# Patient Record
Sex: FEMALE | Race: WHITE | NOT HISPANIC OR LATINO | Employment: FULL TIME | URBAN - METROPOLITAN AREA
[De-identification: names, ages, dates, MRNs, and addresses within clinical notes are randomized per-mention and may not be internally consistent; named-entity substitution may affect disease eponyms.]

---

## 2018-06-26 ENCOUNTER — APPOINTMENT (OUTPATIENT)
Dept: LAB | Age: 48
End: 2018-06-26

## 2018-06-26 ENCOUNTER — TRANSCRIBE ORDERS (OUTPATIENT)
Dept: URGENT CARE | Age: 48
End: 2018-06-26

## 2018-06-26 ENCOUNTER — APPOINTMENT (OUTPATIENT)
Dept: URGENT CARE | Age: 48
End: 2018-06-26

## 2018-06-26 DIAGNOSIS — Z02.1 PHYSICAL EXAM, PRE-EMPLOYMENT: Primary | ICD-10-CM

## 2018-06-26 DIAGNOSIS — Z02.1 PHYSICAL EXAM, PRE-EMPLOYMENT: ICD-10-CM

## 2018-06-26 PROCEDURE — 36415 COLL VENOUS BLD VENIPUNCTURE: CPT

## 2018-06-26 PROCEDURE — 86480 TB TEST CELL IMMUN MEASURE: CPT

## 2018-06-28 LAB
ANNOTATION COMMENT IMP: NORMAL
GAMMA INTERFERON BACKGROUND BLD IA-ACNC: 0.07 IU/ML
M TB IFN-G BLD-IMP: NEGATIVE
M TB IFN-G CD4+ BCKGRND COR BLD-ACNC: <0.01 IU/ML
M TB IFN-G CD4+ T-CELLS BLD-ACNC: 0.04 IU/ML
MITOGEN IGNF BLD-ACNC: 5.86 IU/ML
QUANTIFERON-TB GOLD IN TUBE: NORMAL
SERVICE CMNT-IMP: NORMAL

## 2021-02-16 ENCOUNTER — HOSPITAL ENCOUNTER (EMERGENCY)
Facility: HOSPITAL | Age: 51
Discharge: HOME | End: 2021-02-16
Attending: EMERGENCY MEDICINE | Admitting: EMERGENCY MEDICINE
Payer: OTHER GOVERNMENT

## 2021-02-16 ENCOUNTER — APPOINTMENT (EMERGENCY)
Dept: RADIOLOGY | Facility: HOSPITAL | Age: 51
End: 2021-02-16
Payer: OTHER GOVERNMENT

## 2021-02-16 VITALS
RESPIRATION RATE: 16 BRPM | HEART RATE: 60 BPM | DIASTOLIC BLOOD PRESSURE: 57 MMHG | OXYGEN SATURATION: 98 % | SYSTOLIC BLOOD PRESSURE: 106 MMHG | TEMPERATURE: 98.9 F
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
## 2021-02-16 DIAGNOSIS — M54.9 BACK PAIN: ICD-10-CM

## 2021-02-16 PROCEDURE — 73030 X-RAY EXAM OF SHOULDER: CPT

## 2021-02-16 PROCEDURE — 99283 EMERGENCY DEPT VISIT LOW MDM: CPT

## 2021-02-16 PROCEDURE — 99284 EMERGENCY DEPT VISIT MOD MDM: CPT | Performed by: EMERGENCY MEDICINE

## 2021-02-16 RX ORDER — ASCORBIC ACID 100 MG
1 TABLET,CHEWABLE ORAL DAILY
COMMUNITY

## 2021-02-16 RX ORDER — IBUPROFEN 600 MG/1
600 TABLET ORAL ONCE
Status: COMPLETED | OUTPATIENT
Start: 2021-02-16 | End: 2021-02-16

## 2021-02-16 RX ADMIN — IBUPROFEN 600 MG: 600 TABLET ORAL at 07:08

## 2021-02-16 NOTE — ED ATTENDING ATTESTATION
2/16/2021  IMelany MD, saw and evaluated the patient  I have discussed the patient with the resident/non-physician practitioner and agree with the resident's/non-physician practitioner's findings, Plan of Care, and MDM as documented in the resident's/non-physician practitioner's note, except where noted  All available labs and Radiology studies were reviewed  I was present for key portions of any procedure(s) performed by the resident/non-physician practitioner and I was immediately available to provide assistance  At this point I agree with the current assessment done in the Emergency Department  I have conducted an independent evaluation of this patient a history and physical is as follows:    ED Course      Emergency Department Note- Cedrick Galvan 48 y o  female MRN: [id number]    Unit/Bed#: ED 23 Encounter: [id number]    Cedrick Galvan is a 48 y o  female who presents with   Chief Complaint   Patient presents with    Fall     pt slipped on ice and fell on left shoulder/elbow  History of Present Illness   HPI:  Cedrick Galvan is a 48 y o  female who presents for evaluation of:  Slipped on ice and hurt her right shoulder  Patient denies head strike  She denies AC and AP medications  Her right scapula pain is moderate in intensity, aching sensation, over posterior scapula, movement does not provoke discomfort, and she has not taken any pain medications at home  Review of Systems   Constitutional: Negative for chills and fever  HENT: Negative for congestion and rhinorrhea  Respiratory: Negative for cough and shortness of breath  Cardiovascular: Negative for chest pain and palpitations  Neurological: Negative for dizziness and headaches  All other systems reviewed and are negative  Historical Information   History reviewed  No pertinent past medical history  History reviewed  No pertinent surgical history    Social History   Social History     Substance and Sexual Activity   Alcohol Use Yes    Frequency: Monthly or less    Drinks per session: 1 or 2    Binge frequency: Never     Social History     Substance and Sexual Activity   Drug Use Never     Social History     Tobacco Use   Smoking Status Never Smoker   Smokeless Tobacco Never Used     Family History: non-contributory    Meds/Allergies   all medications and allergies reviewed  No Known Allergies    Objective   First Vitals:   Blood Pressure: 106/57 (21)  Pulse: 60 (21)  Temperature: 98 9 °F (37 2 °C) (21)  Temp Source: Oral (21)  Respirations: 16 (21)  SpO2: 98 % (21)    Current Vitals:   Blood Pressure: 106/57 (21)  Pulse: 60 (21)  Temperature: 98 9 °F (37 2 °C) (21)  Temp Source: Oral (21)  Respirations: 16 (21)  SpO2: 98 % (21)    No intake or output data in the 24 hours ending 21 2304    Invasive Devices     None                 Physical Exam  Vitals signs and nursing note reviewed  Constitutional:       Appearance: Normal appearance  HENT:      Head: Normocephalic and atraumatic  Neck:      Musculoskeletal: Normal range of motion and neck supple  Pulmonary:      Effort: Pulmonary effort is normal  No respiratory distress  Musculoskeletal: Normal range of motion  General: Tenderness (right scapula) present  No deformity  Skin:     General: Skin is warm and dry  Capillary Refill: Capillary refill takes less than 2 seconds  Neurological:      General: No focal deficit present  Mental Status: She is alert and oriented to person, place, and time  Psychiatric:         Mood and Affect: Mood normal          Behavior: Behavior normal          Thought Content: Thought content normal          Judgment: Judgment normal            Medical Decision Makin   Acute right scapular pain: XR r/o Fx; pain control    No results found for this or any previous visit (from the past 36 hour(s))  XR shoulder 2+ views RIGHT   Final Result      No acute osseous abnormality  Workstation performed: [id number]               Portions of the record may have been created with voice recognition software  Occasional wrong word or "sound a like" substitutions may have occurred due to the inherent limitations of voice recognition software  Read the chart carefully and recognize, using context, where substitutions have occurred            Critical Care Time  Procedures

## 2021-02-16 NOTE — ED PROVIDER NOTES
History  Chief Complaint   Patient presents with    Fall     pt slipped on ice and fell on left shoulder/elbow  59-year-old female no major past medical history, presenting due to fall right upper back pain  Patient states she was walking on her driveway this morning when she slipped on ice and fell on her right side  States he has some right upper back pain that is worse with certain movements  Did not take anything for pain at today  Denies any blood thinning medication, head strike loss of consciousness, preceding symptoms  Denies any weakness numbness or tingling in extremities  Prior to Admission Medications   Prescriptions Last Dose Informant Patient Reported? Taking? Ascorbic Acid (Vitamin C) 100 MG CHEW 2/16/2021  Yes Yes   Sig: Chew 1 tablet daily   Cholecalciferol 50 MCG (2000 UT) CAPS 2/16/2021  Yes Yes   Sig: Take 1 capsule by mouth daily   Multiple Vitamin (MULTI-VITAMIN DAILY PO) 2/16/2021  Yes Yes   Sig: Take 1 capsule by mouth   Specialty Vitamins Products (Biotin Plus Keratin) 00529-631 MCG-MG TABS 2/16/2021  Yes Yes   Sig: Take 1 tablet by mouth daily      Facility-Administered Medications: None       History reviewed  No pertinent past medical history  History reviewed  No pertinent surgical history  History reviewed  No pertinent family history  I have reviewed and agree with the history as documented  E-Cigarette/Vaping    E-Cigarette Use Never User      E-Cigarette/Vaping Substances     Social History     Tobacco Use    Smoking status: Never Smoker    Smokeless tobacco: Never Used   Substance Use Topics    Alcohol use: Yes     Frequency: Monthly or less     Drinks per session: 1 or 2     Binge frequency: Never    Drug use: Never        Review of Systems   Constitutional: Negative for chills and fever  HENT: Negative for ear pain and sore throat  Eyes: Negative for visual disturbance  Respiratory: Negative for cough and shortness of breath  Cardiovascular: Negative for chest pain and palpitations  Gastrointestinal: Negative for abdominal pain and vomiting  Genitourinary: Negative for dysuria and hematuria  Musculoskeletal: Positive for back pain  Negative for arthralgias  Skin: Negative for color change, rash and wound  Neurological: Negative for syncope  All other systems reviewed and are negative  Physical Exam  ED Triage Vitals [02/16/21 0631]   Temperature Pulse Respirations Blood Pressure SpO2   98 9 °F (37 2 °C) 60 16 106/57 98 %      Temp Source Heart Rate Source Patient Position - Orthostatic VS BP Location FiO2 (%)   Oral Monitor Lying Right arm --      Pain Score       5             Orthostatic Vital Signs  Vitals:    02/16/21 0631   BP: 106/57   Pulse: 60   Patient Position - Orthostatic VS: Lying       Physical Exam  Vitals signs and nursing note reviewed  Constitutional:       General: She is not in acute distress  Appearance: She is well-developed  HENT:      Head: Normocephalic and atraumatic  Eyes:      Conjunctiva/sclera: Conjunctivae normal    Neck:      Musculoskeletal: Neck supple  Cardiovascular:      Rate and Rhythm: Normal rate and regular rhythm  Heart sounds: No murmur  Pulmonary:      Effort: Pulmonary effort is normal  No respiratory distress  Breath sounds: Normal breath sounds  Abdominal:      Palpations: Abdomen is soft  Tenderness: There is no abdominal tenderness  Musculoskeletal: Normal range of motion  General: Tenderness present  No swelling  Comments: Tenderness over right scapula  Full ROM of neck  Full ROM of right shoulder  Sensation and motor intact   Skin:     General: Skin is warm and dry  Neurological:      Mental Status: She is alert and oriented to person, place, and time  Sensory: No sensory deficit  Motor: No weakness     Psychiatric:         Mood and Affect: Mood normal          ED Medications  Medications   ibuprofen (MOTRIN) tablet 600 mg (600 mg Oral Given 2/16/21 0708)       Diagnostic Studies  Results Reviewed     None                 XR shoulder 2+ views RIGHT   Final Result by Naeem Rodriguez MD (02/16 4157)      No acute osseous abnormality  Workstation performed: [id number]               Procedures  Procedures      ED Course                                       MDM  Number of Diagnoses or Management Options  Back pain:   Fall, initial encounter:   Diagnosis management comments: No sign of acute fracture on XR  Provided oral motrin with improvement in pain  No deficits or weakness, full ROM of shoulder  Will follow up with PCP  Disposition  Final diagnoses:   Fall, initial encounter   Back pain     Time reflects when diagnosis was documented in both MDM as applicable and the Disposition within this note     Time User Action Codes Description Comment    2/16/2021  8:06 AM Rhonda Townshend Add [D13  Juany Forge Fall, initial encounter     2/16/2021  8:06 AM Rhonda Whittingtonm Add [M54 9] Back pain       ED Disposition     ED Disposition Condition Date/Time Comment    Discharge Stable Tue Feb 16, 2021  8:06 AM Devaughn Gottlieb discharge to home/self care  Follow-up Information     Follow up With Specialties Details Why Asa Montes MD Family Medicine   [address] [place] [address]  [phone]            Discharge Medication List as of 2/16/2021  8:06 AM      CONTINUE these medications which have NOT CHANGED    Details   Ascorbic Acid (Vitamin C) 100 MG CHEW Chew 1 tablet daily, Historical Med      Cholecalciferol 50 MCG (2000 UT) CAPS Take 1 capsule by mouth daily, Historical Med      Multiple Vitamin (MULTI-VITAMIN DAILY PO) Take 1 capsule by mouth, Historical Med      Specialty Vitamins Products (Biotin Plus Keratin) 13171-508 MCG-MG TABS Take 1 tablet by mouth daily, Historical Med           No discharge procedures on file      PDMP Review     None           ED Provider  Attending physically available and evaluated Tena Angelucci I managed the patient along with the ED Attending      Electronically Signed by         Shelby Brian DO  02/16/21 3363

## 2021-12-24 ENCOUNTER — APPOINTMENT (OUTPATIENT)
Dept: RADIOLOGY | Facility: CLINIC | Age: 51
End: 2021-12-24
Payer: COMMERCIAL

## 2021-12-24 DIAGNOSIS — M54.9 DORSALGIA: ICD-10-CM

## 2021-12-24 DIAGNOSIS — J10.1 INFLUENZA A: ICD-10-CM

## 2021-12-24 PROCEDURE — 71046 X-RAY EXAM CHEST 2 VIEWS: CPT

## 2022-01-24 ENCOUNTER — APPOINTMENT (OUTPATIENT)
Dept: LAB | Facility: HOSPITAL | Age: 52
End: 2022-01-24
Payer: COMMERCIAL

## 2022-01-24 DIAGNOSIS — N93.8 DUB (DYSFUNCTIONAL UTERINE BLEEDING): ICD-10-CM

## 2022-01-24 LAB — B-HCG SERPL-ACNC: <2 MIU/ML

## 2022-01-24 PROCEDURE — 84702 CHORIONIC GONADOTROPIN TEST: CPT

## 2022-01-24 PROCEDURE — 36415 COLL VENOUS BLD VENIPUNCTURE: CPT

## 2022-03-30 ENCOUNTER — LAB REQUISITION (OUTPATIENT)
Dept: LAB | Facility: HOSPITAL | Age: 52
End: 2022-03-30

## 2022-03-30 DIAGNOSIS — Z00.8 ENCOUNTER FOR OTHER GENERAL EXAMINATION: ICD-10-CM

## 2022-03-30 LAB
CHOLEST SERPL-MCNC: 156 MG/DL
EST. AVERAGE GLUCOSE BLD GHB EST-MCNC: 103 MG/DL
HBA1C MFR BLD: 5.2 %
HDLC SERPL-MCNC: 80 MG/DL
LDLC SERPL CALC-MCNC: 66 MG/DL (ref 0–100)
NONHDLC SERPL-MCNC: 76 MG/DL
TRIGL SERPL-MCNC: 49 MG/DL

## 2022-03-30 PROCEDURE — 80061 LIPID PANEL: CPT | Performed by: PREVENTIVE MEDICINE

## 2022-03-30 PROCEDURE — 83036 HEMOGLOBIN GLYCOSYLATED A1C: CPT | Performed by: PREVENTIVE MEDICINE

## 2022-05-11 ENCOUNTER — HOSPITAL ENCOUNTER (EMERGENCY)
Facility: HOSPITAL | Age: 52
Discharge: HOME | End: 2022-05-11
Attending: EMERGENCY MEDICINE
Payer: OTHER MISCELLANEOUS

## 2022-05-11 ENCOUNTER — APPOINTMENT (EMERGENCY)
Dept: RADIOLOGY | Facility: HOSPITAL | Age: 52
End: 2022-05-11
Payer: OTHER MISCELLANEOUS

## 2022-05-11 VITALS
WEIGHT: 141 LBS | OXYGEN SATURATION: 99 % | HEART RATE: 77 BPM | DIASTOLIC BLOOD PRESSURE: 74 MMHG | SYSTOLIC BLOOD PRESSURE: 116 MMHG | RESPIRATION RATE: 18 BRPM | TEMPERATURE: 97.7 F

## 2022-05-11 DIAGNOSIS — S63.619A FINGER SPRAIN: ICD-10-CM

## 2022-05-11 DIAGNOSIS — S69.91XA INJURY OF FINGER OF RIGHT HAND, INITIAL ENCOUNTER: Primary | ICD-10-CM

## 2022-05-11 PROCEDURE — 99283 EMERGENCY DEPT VISIT LOW MDM: CPT

## 2022-05-11 PROCEDURE — 99282 EMERGENCY DEPT VISIT SF MDM: CPT | Performed by: EMERGENCY MEDICINE

## 2022-05-11 PROCEDURE — 73140 X-RAY EXAM OF FINGER(S): CPT

## 2022-05-11 NOTE — ED PROVIDER NOTES
History  Chief Complaint   Patient presents with    Finger Injury     Pt reports injuring finger due to a work related injury yesterday  PT states finger got caught on door      70-year-old female presenting with right pinky injury  Patient states yesterday she believes that she jammed her right little finger against a refrigerator door  Stated that initially she did not think much of it as it did not cause much pain but had increasing pain and some swelling in the mid phalanx  Full range of motion intact  Neurovascularly intact  No open wound  Happened at the work place  Denies any other injury or complaint  Prior to Admission Medications   Prescriptions Last Dose Informant Patient Reported? Taking? Ascorbic Acid (Vitamin C) 100 MG CHEW   Yes No   Sig: Chew 1 tablet daily   Cholecalciferol 50 MCG (2000 UT) CAPS   Yes No   Sig: Take 1 capsule by mouth daily   Multiple Vitamin (MULTI-VITAMIN DAILY PO)   Yes No   Sig: Take 1 capsule by mouth   Specialty Vitamins Products (Biotin Plus Keratin) 68458-356 MCG-MG TABS   Yes No   Sig: Take 1 tablet by mouth daily      Facility-Administered Medications: None       History reviewed  No pertinent past medical history  History reviewed  No pertinent surgical history  History reviewed  No pertinent family history  I have reviewed and agree with the history as documented  E-Cigarette/Vaping    E-Cigarette Use Never User      E-Cigarette/Vaping Substances     Social History     Tobacco Use    Smoking status: Never Smoker    Smokeless tobacco: Never Used   Vaping Use    Vaping Use: Never used   Substance Use Topics    Alcohol use: Yes    Drug use: Never        Review of Systems   Constitutional: Negative for appetite change, chills, diaphoresis, fever and unexpected weight change  HENT: Negative for congestion and rhinorrhea  Eyes: Negative for photophobia and visual disturbance     Respiratory: Negative for cough, chest tightness and shortness of breath  Cardiovascular: Negative for chest pain, palpitations and leg swelling  Gastrointestinal: Negative for abdominal distention, abdominal pain, blood in stool, constipation, diarrhea, nausea and vomiting  Genitourinary: Negative for dysuria and hematuria  Musculoskeletal: Positive for arthralgias  Negative for back pain, joint swelling, neck pain and neck stiffness  Skin: Negative for color change, pallor, rash and wound  Neurological: Negative for dizziness, syncope, weakness, light-headedness and headaches  Psychiatric/Behavioral: Negative for agitation  All other systems reviewed and are negative  Physical Exam  ED Triage Vitals [05/11/22 0935]   Temperature Pulse Respirations Blood Pressure SpO2   97 7 °F (36 5 °C) 77 18 116/74 99 %      Temp Source Heart Rate Source Patient Position - Orthostatic VS BP Location FiO2 (%)   Oral Monitor Sitting Right arm --      Pain Score       --             Orthostatic Vital Signs  Vitals:    05/11/22 0935   BP: 116/74   Pulse: 77   Patient Position - Orthostatic VS: Sitting       Physical Exam  Vitals and nursing note reviewed  Constitutional:       General: She is not in acute distress  Appearance: Normal appearance  She is well-developed  She is not ill-appearing, toxic-appearing or diaphoretic  HENT:      Head: Normocephalic and atraumatic  Nose: Nose normal  No congestion or rhinorrhea  Mouth/Throat:      Mouth: Mucous membranes are moist       Pharynx: Oropharynx is clear  No oropharyngeal exudate or posterior oropharyngeal erythema  Eyes:      General: No scleral icterus  Right eye: No discharge  Left eye: No discharge  Extraocular Movements: Extraocular movements intact  Conjunctiva/sclera: Conjunctivae normal       Pupils: Pupils are equal, round, and reactive to light  Neck:      Vascular: No JVD  Trachea: No tracheal deviation     Cardiovascular:      Rate and Rhythm: Normal rate and regular rhythm  Heart sounds: Normal heart sounds  No murmur heard  No friction rub  No gallop  Comments: Normal rate and regular rhythm  Pulmonary:      Effort: Pulmonary effort is normal  No respiratory distress  Breath sounds: Normal breath sounds  No stridor  No wheezing or rales  Comments: Clear to auscultation bilaterally  Chest:      Chest wall: No tenderness  Abdominal:      General: Bowel sounds are normal  There is no distension  Palpations: Abdomen is soft  Tenderness: There is no abdominal tenderness  There is no right CVA tenderness, left CVA tenderness, guarding or rebound  Comments: Soft, nontender, nondistended  Normal bowel sounds throughout   Musculoskeletal:         General: Swelling and tenderness present  No deformity or signs of injury  Normal range of motion  Cervical back: Normal range of motion and neck supple  No rigidity  No muscular tenderness  Right lower leg: No edema  Left lower leg: No edema  Comments: Very mild swelling right little finger middle phalanx as well as tenderness to palpation   Lymphadenopathy:      Cervical: No cervical adenopathy  Skin:     General: Skin is warm and dry  Coloration: Skin is not pale  Findings: No erythema or rash  Neurological:      General: No focal deficit present  Mental Status: She is alert  Mental status is at baseline  Sensory: No sensory deficit  Motor: No weakness or abnormal muscle tone  Coordination: Coordination normal       Gait: Gait normal       Comments: Alert  Right little finger full range of motion including the isolated flexion distal phalanx all intact  Sensation intact all distributions  Strength and sensation otherwise grossly intact  Ambulatory without difficulty at baseline  Psychiatric:         Behavior: Behavior normal          Thought Content:  Thought content normal          ED Medications  Medications - No data to display    Diagnostic Studies  Results Reviewed     None                 XR finger fifth digit-pinkie RIGHT    (Results Pending)         Procedures  Procedures      ED Course                             SBIRT 20yo+    Flowsheet Row Most Recent Value   SBIRT (23 yo +)    In order to provide better care to our patients, we are screening all of our patients for alcohol and drug use  Would it be okay to ask you these screening questions? Unable to answer at this time Filed at: 05/11/2022 7385                [facility]  Number of Diagnoses or Management Options  Finger sprain  Injury of finger of right hand, initial encounter  Diagnosis management comments: 51-year-old female presenting with right pinky injury  Minimal pain and swelling with full range of motion sensation intact  Plan for x-rays  Patient declining any medication at this time  Reassess  X-ray no acute process  Pain still well controlled  Discussed results and recommendations  Advise follow-up primary care provider  Medication recommendations  Given instructions and return precautions  All questions answered  Patient acknowledged understanding of all written and verbal instructions and return precautions for discharge         Amount and/or Complexity of Data Reviewed  Clinical lab tests: reviewed  Tests in the radiology section of CPT®: reviewed and ordered  Tests in the medicine section of CPT®: reviewed  Review and summarize past medical records: yes  Discuss the patient with other providers: yes  Independent visualization of images, tracings, or specimens: yes    Risk of Complications, Morbidity, and/or Mortality  Presenting problems: moderate  Diagnostic procedures: moderate  Management options: moderate    Patient Progress  Patient progress: stable      Disposition  Final diagnoses:   Injury of finger of right hand, initial encounter   Finger sprain     Time reflects when diagnosis was documented in both MDM as applicable and the Disposition within this note     Time User Action Codes Description Comment    5/11/2022 10:05 AM Lyle Fransisco Add [S69 91XA] Injury of finger of right hand, initial encounter     5/11/2022 10:18 AM Lylejuan Moody Add [K33 604I] Finger sprain       ED Disposition     ED Disposition   Discharge    Condition   Stable    Date/Time   Wed May 11, 2022 10:06 AM    Comment   Duane Levy discharge to home/self care  Follow-up Information     Follow up With Specialties Details Why Contact Info    Mary Conroy MD Family Medicine Schedule an appointment as soon as possible for a visit in 1 week  [address]  [phone]            Patient's Medications   Discharge Prescriptions    No medications on file     No discharge procedures on file  PDMP Review     None           ED Provider  Attending physically available and evaluated Duane Levy I managed the patient along with the ED Attending      Electronically Signed by         Tg Delgado MD  05/11/22 2473

## 2022-05-11 NOTE — DISCHARGE INSTRUCTIONS
Please follow up with primary care provider and Occupational Medicine  Recommend Tylenol 650 mg and ibuprofen 600 mg every 6 hours as needed for pain  Please refer to the following documents for additional instructions and return precautions

## 2022-05-11 NOTE — ED ATTENDING ATTESTATION
5/11/2022  INaomi DO, saw and evaluated the patient  I have discussed the patient with the resident/non-physician practitioner and agree with the resident's/non-physician practitioner's findings, Plan of Care, and MDM as documented in the resident's/non-physician practitioner's note, except where noted  All available labs and Radiology studies were reviewed  I was present for key portions of any procedure(s) performed by the resident/non-physician practitioner and I was immediately available to provide assistance  At this point I agree with the current assessment done in the Emergency Department  I have conducted an independent evaluation of this patient a history and physical is as follows:    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 5th finger pain after jamming it against a door yesterday  Initially, she did not think she injured it but had increased pain today and was advised to come to the ED for evaluation  She is right-handed  She has no swelling, deformity or ecchymosis of that finger and has no other injury to the other fingers, hand or wrist   She denies changes in range of motion, strength or sensation  No prior fracture or surgery to that finger  No recent travel or sick contacts  ROS: No associated fever, LH/dizziness, CP, SOB, n/v/d  Denies other injury or complaint  PE: NAD, appears comfortable, alert; PERRL, EOMI; MMM, no posterior oropharyngeal exudate, edema or erythema; HRR, no murmur; lungs CTA w/o w/r/r, POx 99% on RA (nl); (-) LE edema, FROM extremities x4, including the right 5th finger, mild TTP with palpation of the right 5th PIP but not hot with axial loading of that finger; skin p/w/d  DDx:  Right 5th finger pain - mild contusion, "jammed" joint, doubt fracture or dislocation  A/P: Will check x-ray, treat symptoms, reevaluate for further work up and disposition    Employee injury paperwork completed and returned with patient            ED Course         Critical Care Time  Procedures

## 2022-11-08 ENCOUNTER — LAB REQUISITION (OUTPATIENT)
Dept: LAB | Facility: HOSPITAL | Age: 52
End: 2022-11-08

## 2022-11-08 DIAGNOSIS — R68.89 OTHER GENERAL SYMPTOMS AND SIGNS: ICD-10-CM

## 2022-11-09 LAB
FLUAV RNA RESP QL NAA+PROBE: NEGATIVE
FLUBV RNA RESP QL NAA+PROBE: NEGATIVE
SARS-COV-2 RNA RESP QL NAA+PROBE: NEGATIVE

## 2023-08-01 ENCOUNTER — OFFICE VISIT (OUTPATIENT)
Dept: URGENT CARE | Age: 53
End: 2023-08-01
Payer: COMMERCIAL

## 2023-08-01 VITALS
HEART RATE: 74 BPM | DIASTOLIC BLOOD PRESSURE: 78 MMHG | TEMPERATURE: 97.8 F | RESPIRATION RATE: 18 BRPM | OXYGEN SATURATION: 99 % | SYSTOLIC BLOOD PRESSURE: 122 MMHG

## 2023-08-01 DIAGNOSIS — R05.1 ACUTE COUGH: Primary | ICD-10-CM

## 2023-08-01 DIAGNOSIS — U07.1 COVID-19: ICD-10-CM

## 2023-08-01 DIAGNOSIS — J06.9 VIRAL URI: ICD-10-CM

## 2023-08-01 LAB
SARS-COV-2 AG UPPER RESP QL IA: POSITIVE
VALID CONTROL: ABNORMAL

## 2023-08-01 PROCEDURE — 99213 OFFICE O/P EST LOW 20 MIN: CPT | Performed by: EMERGENCY MEDICINE

## 2023-08-01 PROCEDURE — 87811 SARS-COV-2 COVID19 W/OPTIC: CPT | Performed by: EMERGENCY MEDICINE

## 2023-08-01 RX ORDER — ACETAMINOPHEN 325 MG/1
650 TABLET ORAL ONCE
Status: COMPLETED | OUTPATIENT
Start: 2023-08-01 | End: 2023-08-01

## 2023-08-01 RX ORDER — NIRMATRELVIR AND RITONAVIR 300-100 MG
3 KIT ORAL 2 TIMES DAILY
Qty: 30 TABLET | Refills: 0 | Status: SHIPPED | OUTPATIENT
Start: 2023-08-01 | End: 2023-08-06

## 2023-08-01 RX ADMIN — ACETAMINOPHEN 650 MG: 325 TABLET ORAL at 17:18

## 2023-08-01 NOTE — LETTER
. [facility] Jones [address]  [address] [address]  Dept: [phone]    August 1, 2023    Patient: Krzysztof Ashby  YOB: 1970    Krzysztof Ashby was seen and evaluated at our [facility]. Please note if Covid or Flu test is positive, they may return to work on 8/4/23, as this is 5 days from the onset of symptoms. Upon return, they must then adhere to strict masking for an additional 5 days.     Sincerely,    Adiel Barry, DO

## 2023-08-01 NOTE — PROGRESS NOTES
[facility]        NAME: Williams Alfonso is a 48 y.o. female  : 1970    MRN: [id number]  DATE: 2023  TIME: 5:09 PM    Assessment and Plan   Acute cough [R05.1]  1. Acute cough  Poct Covid 19 Rapid Antigen Test    acetaminophen (TYLENOL) tablet 650 mg      2. Viral URI        3. COVID-19  nirmatrelvir & ritonavir (Paxlovid, 300/100,) tablet therapy pack        -Point-of-care COVID test positive, will treat with Paxlovid at this time  -Patient denies chest pain, shortness of breath, no hypoxia tachypnea or obstruction muscle use noted, no abdominal tenderness to palpation noted, no peritoneal signs, doubt acute abdominal process at this time and suspect his symptoms are related to COVID-19 infection  - Patient advised that she develops worsening abdominal pain, intermittent inability to tolerate p.o. intake, chest pain, shortness of breath that she should be evaluated in the ED  -All questions answered at bedside, patient was amenable to plan and voiced understanding. Patient Instructions       Follow up with PCP in 3-5 days. Proceed to  ER if symptoms worsen. Chief Complaint     Chief Complaint   Patient presents with   • Abdominal Pain     Patient relates last Pm started nausea. Recently came back form vacation. Started having body aches, chills, fever         History of Present Illness       51-year-old female, previously healthy presents with 3 days of sore throat which is improving, nasal congestion, dry nonproductive cough, generalized body aches and nausea without vomiting. Patient also endorses generalized abdominal discomfort without overt pain or diarrhea. Patient states that she recently returned from a cruise where several other of her close contacts have been sick with similar symptoms. She states that her daughter whom she was with was sick with similar symptoms as well however is feeling better today.   She denies diarrhea, vomiting does endorse a fever at home, Tmax of 101 °F.  Denies chest pain, chest tightness shortness of breath or dyspnea on exertion. Abdominal Pain  This is a new problem. The current episode started in the past 7 days. The onset quality is sudden. The pain is located in the generalized abdominal region. The pain is at a severity of 2/10. The pain is mild. The quality of the pain is cramping. The abdominal pain does not radiate. Associated symptoms include a fever. Pertinent negatives include no anorexia, arthralgias, belching, constipation, diarrhea, dysuria, flatus, frequency, headaches, hematochezia, hematuria, melena, myalgias, nausea, vomiting or weight loss. Nothing aggravates the pain. She has tried nothing for the symptoms. There is no history of irritable bowel syndrome or ulcerative colitis. Review of Systems   Review of Systems   Constitutional: Positive for activity change, appetite change, chills, fatigue and fever. Negative for diaphoresis and weight loss. HENT: Positive for congestion, postnasal drip, rhinorrhea and sore throat. Negative for dental problem, drooling, ear discharge, ear pain, facial swelling, hearing loss, mouth sores, nosebleeds, sinus pressure, sinus pain, sneezing, tinnitus, trouble swallowing and voice change. Eyes: Negative for pain and visual disturbance. Respiratory: Negative for apnea, cough, choking, chest tightness, shortness of breath, wheezing and stridor. Cardiovascular: Negative for chest pain and palpitations. Gastrointestinal: Positive for abdominal pain. Negative for abdominal distention, anal bleeding, anorexia, blood in stool, constipation, diarrhea, flatus, hematochezia, melena, nausea and vomiting. Genitourinary: Negative for dysuria, frequency and hematuria. Musculoskeletal: Negative for arthralgias, back pain and myalgias. Skin: Negative for color change and rash.    Neurological: Negative for dizziness, tremors, seizures, syncope, facial asymmetry, speech difficulty, weakness, light-headedness, numbness and headaches. All other systems reviewed and are negative. Current Medications       Current Outpatient Medications:   •  Ascorbic Acid (Vitamin C) 100 MG CHEW, Chew 1 tablet daily, Disp: , Rfl:   •  Cholecalciferol 50 MCG (2000 UT) CAPS, Take 1 capsule by mouth daily, Disp: , Rfl:   •  Multiple Vitamin (MULTI-VITAMIN DAILY PO), Take 1 capsule by mouth, Disp: , Rfl:   •  nirmatrelvir & ritonavir (Paxlovid, 300/100,) tablet therapy pack, Take 3 tablets by mouth 2 (two) times a day for 5 days Take 2 nirmatrelvir tablets + 1 ritonavir tablet together per dose, Disp: 30 tablet, Rfl: 0  •  Specialty Vitamins Products (Biotin Plus Keratin) 09248-041 MCG-MG TABS, Take 1 tablet by mouth daily, Disp: , Rfl:     Current Facility-Administered Medications:   •  acetaminophen (TYLENOL) tablet 650 mg, 650 mg, Oral, Once, Henna Hester, DO    Current Allergies     Allergies as of 08/01/2023   • (No Known Allergies)            The following portions of the patient's history were reviewed and updated as appropriate: allergies, current medications, past family history, past medical history, past social history, past surgical history and problem list.     History reviewed. No pertinent past medical history. History reviewed. No pertinent surgical history. History reviewed. No pertinent family history. Medications have been verified. Objective   /78   Pulse 74   Temp 97.8 °F (36.6 °C)   Resp 18   SpO2 99%   No LMP recorded. Physical Exam     Physical Exam  Vitals and nursing note reviewed. Constitutional:       General: She is not in acute distress. Appearance: Normal appearance. She is not ill-appearing, toxic-appearing or diaphoretic. HENT:      Head: Normocephalic and atraumatic. Right Ear: Tympanic membrane, ear canal and external ear normal. There is no impacted cerumen.       Left Ear: Tympanic membrane and external ear normal. Nose: Congestion and rhinorrhea present. Mouth/Throat:      Mouth: Mucous membranes are moist. No injury, lacerations, oral lesions or angioedema. Pharynx: Posterior oropharyngeal erythema present. No pharyngeal swelling, oropharyngeal exudate or uvula swelling. Tonsils: No tonsillar exudate or tonsillar abscesses. 0 on the right. 0 on the left. Eyes:      General: No scleral icterus. Right eye: No discharge. Left eye: No discharge. Extraocular Movements: Extraocular movements intact. Pupils: Pupils are equal, round, and reactive to light. Cardiovascular:      Rate and Rhythm: Normal rate and regular rhythm. Pulses: Normal pulses. Carotid pulses are 2+ on the right side and 2+ on the left side. Radial pulses are 2+ on the right side and 2+ on the left side. Heart sounds: No murmur heard. No friction rub. No gallop. Pulmonary:      Effort: Pulmonary effort is normal. No respiratory distress. Breath sounds: Normal breath sounds. No stridor. No wheezing, rhonchi or rales. Chest:      Chest wall: No tenderness. Abdominal:      General: Abdomen is flat. There is no distension. Palpations: There is no mass. Tenderness: There is no abdominal tenderness. There is no right CVA tenderness, left CVA tenderness, guarding or rebound. Hernia: No hernia is present. Musculoskeletal:         General: No swelling, tenderness, deformity or signs of injury. Right lower leg: No edema. Left lower leg: No edema. Skin:     General: Skin is warm and dry. Coloration: Skin is not jaundiced or pale. Findings: No bruising, erythema, lesion or rash. Neurological:      General: No focal deficit present. Mental Status: She is alert and oriented to person, place, and time. Cranial Nerves: No cranial nerve deficit. Sensory: No sensory deficit. Motor: No weakness.       Coordination: Coordination normal.      Gait: Gait normal.      Deep Tendon Reflexes: Reflexes normal.   Psychiatric:         Mood and Affect: Mood normal.         Behavior: Behavior normal.

## 2023-08-31 ENCOUNTER — TELEPHONE (OUTPATIENT)
Dept: PSYCHIATRY | Facility: CLINIC | Age: 53
End: 2023-08-31

## 2023-08-31 NOTE — TELEPHONE ENCOUNTER
Patient has been added to the Medication Management and Talk Therapy wait list without a referral.    Insurance: [employer] Type:    Commercial [x]   Medicaid []   [place] (if applicable)   Medicare []  Location Preference: [address] [place] [address]  Provider Preference: none  Virtual: Yes [x] No []    Advised the patient to contact her PCP for a referral.

## 2023-12-28 ENCOUNTER — TELEPHONE (OUTPATIENT)
Dept: PSYCHIATRY | Facility: CLINIC | Age: 53
End: 2023-12-28

## 2023-12-28 NOTE — TELEPHONE ENCOUNTER
"Contacted patient off of Medication Management  to verify needs of services in attempts to offer patient an appointment at [facility] .unable to  LVM for patient to contact intake dept  in regards to wait list due to \"call cannot be completed at this time.\" After 2x attempts   "

## 2024-01-02 ENCOUNTER — TELEPHONE (OUTPATIENT)
Dept: PSYCHIATRY | Facility: CLINIC | Age: 54
End: 2024-01-02

## 2024-01-02 NOTE — TELEPHONE ENCOUNTER
Contacted patient off of Medication Management  to verify needs of services in attempts to offer patient an appointment at [facility]. LVM for patient to contact intake dept  in regards to wait list

## 2024-01-03 NOTE — TELEPHONE ENCOUNTER
Spoke to pt in regards to scheduling. Will reach back out to pt tomorrow after 3 to finish with scheduling

## 2024-01-03 NOTE — TELEPHONE ENCOUNTER
Patient called in regards to scheduling appointment. [facility] to call and schedule.

## 2024-01-04 ENCOUNTER — TELEPHONE (OUTPATIENT)
Dept: PSYCHIATRY | Facility: CLINIC | Age: 54
End: 2024-01-04

## 2024-01-04 NOTE — TELEPHONE ENCOUNTER
"Behavioral Health Outpatient Intake Questions    Referred By   : self     Please advise interviewee that they need to answer all questions truthfully to allow for best care, and any misrepresentations of information may affect their ability to be seen at this clinic   => Was this discussed? Yes     If Minor Child (under age 18)    Who is/are the legal guardian(s) of the child?     Is there a custody agreement? No     If \"YES\"- Custody orders must be obtained prior to scheduling the first appointment  In addition, Consent to Treatment must be signed by all legal guardians prior to scheduling the first appointment    If \"NO\"- Consent to Treatment must be signed by all legal guardians prior to scheduling the first appointment    Behavioral Health Outpatient Intake History -     Presenting Problem (in patient's own words): work stress, outside of work stress     Are there any communication barriers for this patient?     No                                               If yes, please describe barriers:     If there is a unique situation, please refer to Iain Beck/Eleanor Sanz for final determination.    Are you taking any psychiatric medications? No     If \"YES\" -What are they         If \"YES\" -Who prescribes?     Has the Patient previously received outpatient Talk Therapy or Medication Management from [facility]  No        If \"YES\"- When, Where and with Whom?         If \"NO\" -Has Patient received these services elsewhere?       If \"YES\" -When, Where, and with Whom?Marriage Counseling w/ she thinks     Has the Patient abused alcohol or other substances in the last 6 months ? No  No concerns of substance abuse are reported.     If \"YES\" -What substance, How much, How often?     If illegal substance: Refer to [facility] (for KAYLEE) or [facility] Off[facility].   If Alcohol in excess of 10 drinks per week:  Refer to [facility] (for KAYLEE) or [facility] Offices    Legal History-     Is this treatment court ordered? No " "  If \"yes \"send to :  Talk Therapy : Send to Iain Beck/Eleanor Sanz for final determination   Med Management: Send to Dr Cortez for final determination     Has the Patient been convicted of a felony?  No   If \"Yes\" send to -When, What?  Talk Therapy : Send to Iain Beck/Eleanor Sanz for final determination   Med Management: Send to Dr Cortez for final determination     ACCEPTED as a patient Yes  If \"Yes\" Appointment Date: 1/29 with Olga Godwin @ 4pm    Referred Elsewhere? No  If “Yes” - (Where? Ex: [facility], [facility], [facility], [facility], etc.)       Name of Insurance Co: [facility] plan 361  Insurance ID# [id number]  Insurance Phone #  If ins is primary or secondary?  If patient is a minor, parents information such as Name, D.O.B of guarantor.  "

## 2024-01-15 NOTE — TELEPHONE ENCOUNTER
Contacted patient off of Medication Management  to verify needs of services in attempts to offer patient an appointment at [facility]. LVM for patient to contact intake dept  in regards to wait list

## 2024-01-22 NOTE — TELEPHONE ENCOUNTER
Contacted patient off of Medication Management  to verify needs of services in attempts to offer patient an appointment at [facility]. Spoke with patient whom stated they are scheduled with talk therapy and do not require med mgmt services at this time.     Abdominal Pain, N/V/D

## 2024-01-29 ENCOUNTER — TELEMEDICINE (OUTPATIENT)
Dept: BEHAVIORAL/MENTAL HEALTH CLINIC | Facility: CLINIC | Age: 54
End: 2024-01-29
Payer: COMMERCIAL

## 2024-01-29 DIAGNOSIS — F41.9 ANXIETY DISORDER, UNSPECIFIED TYPE: Primary | ICD-10-CM

## 2024-01-29 PROCEDURE — 90791 PSYCH DIAGNOSTIC EVALUATION: CPT | Performed by: COUNSELOR

## 2024-01-29 NOTE — PSYCH
Behavioral Health Psychotherapy Assessment    Date of Initial Psychotherapy Assessment: 01/29/24  Referral Source: self  Has a release of information been signed for the referral source? NA    Preferred Name: Janeth Smith  Preferred Pronouns: She/her  YOB: 1970 Age: 53 y.o.  Sex assigned at birth: female   Gender Identity: female  Race:    Preferred Language: English    Emergency Contact:  Full Name: Jeferson Smith  Relationship to Client:   Contact information: [phone]    Primary Care Physician:  Olga Villanueva MD  [address] [facility] [address] [phone]  Has a release of information been signed? NA    Physical Health History:  Past surgical procedures: C section with her daughter in 2013  Do you have a history of any of the following: none reported  Do you have any mobility issues? No    Relevant Family History:  Janeth reports that her older sister has symptoms of bipolar but is unwilling to get help and also experiences paranoia.  Janeth reports that her mother has said that her father suffered from schizophrenia but they have not had contact with father in years. Janeth was raised by her aunt in [place] [facility] when mother had to go back to [place] to raised children. She experiences anxiety and depressive symptoms.     Presenting Problem (What brings you in?)  Janeth reports stress related to aunts health concerns after a fall in the last year or so. States that her aunt raised her for several years while her mother was in [place]. 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 She reports her 10 year old daughter is very busy and she ends up running her around after work to after school activities. She reports after fariba started to experience racing heart rate and struggling to breathe. She reports chest pain for the next few days.  She reports that she get easily agitated and has lashed out in the past and  is concerned about this in the future with her daughter.    Mental Health Advance Directive:  Do you currently have a Mental Health Advance Directive?no    Diagnosis:  No diagnosis found.    Initial Assessment:     Current Mental Status:    Appearance: appropriate      Behavior/Manner: cooperative      Affect/Mood:  Anxious and depressed    Speech:  Normal    Sleep:  Normal    Oriented to: oriented to self, oriented to place and oriented to time       Clinical Symptoms    Anxiety: yes      Anxiety Symptoms: excessive worry, nervous/anxious, chest tightness and shortness of breath      Have you ever been assaultive to others or the environment: No      Have you ever been self-injurious: No      Counseling History:  Previous Counseling or Treatment  (Mental Health or Drug & Alcohol): Yes    Previous Counseling Details:  Marriage counseling in the past  Have you previously taken psychiatric medications: No      Suicide Risk Assessment  Have you ever had a suicide attempt: No    Have you had incidents of suicidal ideation: No    Are you currently experiencing suicidal thoughts: No      Substance Abuse/Addiction Assessment:  Alcohol: No    Heroin: No    Fentanyl: No    Opiates: No    Cocaine: No    Amphetamines: No    Hallucinogens: No    Club Drugs: No    Benzodiazepines: No    Other Rx Meds: No    Marijuana: No    Tobacco/Nicotine: No    Have you experienced blackouts as a result of substance use: No    Have you had any periods of abstinence: No    Have you experienced symptoms of withdrawal: No    Have you ever overdosed on any substances?: No    Are you currently using any Medication Assisted Treatment for Substance Use: No      Compulsive Behaviors:  Compulsive Behavior Information:  None reported    Disordered Eating History:  Do you have a history of disordered eating: No      Social Determinants of Health:    SDOH:  Stress    Trauma and Abuse History:    Have you ever been abused: No      Legal History:    Have  you ever been arrested  or had a DUI: No      Have you been incarcerated: No      Are you currently on parole/probation: No      Any current Children and Youth involvement: No      Any pending legal charges: No      Relationship History:    Current marital status:       Natural Supports:  Friends    Relationship History:   to her  since 2011. She reports relationship is healthy and he is supportive. They have utilized marriage counseling in the past and she reports this was helpful.    Close friend that will help out with her daughter, who is now 10 years old. Has a few close friends that still live in [place] and some colleagues.     Has two older sister and younger brother. Does not speak to oldest sister. Brother lives in [place] and sister's live in [place].    Employment History    Are you currently employed: Yes      Employer/ Job title:  Medical technolgist    Sources of income/financial support:  Work and family members     History:      Status: retired   Branch: [employer]  Educational History:     Have you ever been diagnosed with a learning disability: No      Highest level of education:  Bachelor's Degree    Have you ever had an IEP or 504-plan: No      Do you need assistance with reading or writing: No      Recommended Treatment:     Psychotherapy:  Individual sessions    Frequency:  2 times    Session frequency:  Monthly      Visit start and stop times:    01/29/24  Start Time: 1600  Stop Time: 1640  Total Visit Time: 40 minutes  Virtual Regular Visit    Verification of patient location:    Patient is located at Home in the following state in which I hold an active license [place]      Assessment/Plan:    Problem List Items Addressed This Visit    None      Goals addressed in session: Goal 1 and Goal 2          Reason for visit is No chief complaint on file.       Encounter provider Olga Godwin    Provider located at [facility]  [facility] [place]  [address]  [place] [id number] [phone]      Recent Visits  Date Type Provider Dept   01/29/24 Telemedicine Olga Godwin [facility] [place]   Showing recent visits within past 7 days and meeting all other requirements  Future Appointments  No visits were found meeting these conditions.  Showing future appointments within next 150 days and meeting all other requirements       The patient was identified by name and date of birth. Janeth Smith was informed that this is a telemedicine visit and that the visit is being conducted throughthe Epic Embedded platform. She agrees to proceed..  My office door was closed. No one else was in the room.  She acknowledged consent and understanding of privacy and security of the video platform. The patient has agreed to participate and understands they can discontinue the visit at any time.    Patient is aware this is a billable service.     Subjective  Janeth Smith is a 53 y.o. female  .      HPI     No past medical history on file.    No past surgical history on file.    Current Outpatient Medications   Medication Sig Dispense Refill   • Ascorbic Acid (Vitamin C) 100 MG CHEW Chew 1 tablet daily     • Cholecalciferol 50 MCG (2000 UT) CAPS Take 1 capsule by mouth daily     • Multiple Vitamin (MULTI-VITAMIN DAILY PO) Take 1 capsule by mouth     • Specialty Vitamins Products (Biotin Plus Keratin) 26893-779 MCG-MG TABS Take 1 tablet by mouth daily       No current facility-administered medications for this visit.        No Known Allergies    Review of Systems   Respiratory:  Positive for chest tightness and shortness of breath.    Psychiatric/Behavioral:  The patient is nervous/anxious.        Video Exam    There were no vitals filed for this visit.    Physical Exam  Psychiatric:         Behavior: Behavior is cooperative.

## 2024-02-12 ENCOUNTER — TELEMEDICINE (OUTPATIENT)
Dept: BEHAVIORAL/MENTAL HEALTH CLINIC | Facility: CLINIC | Age: 54
End: 2024-02-12
Payer: COMMERCIAL

## 2024-02-12 DIAGNOSIS — F41.9 ANXIETY DISORDER, UNSPECIFIED TYPE: Primary | ICD-10-CM

## 2024-02-12 PROCEDURE — 90834 PSYTX W PT 45 MINUTES: CPT | Performed by: COUNSELOR

## 2024-02-12 NOTE — BH CRISIS PLAN
Client Name: Janeth Smith       Client YOB: 1970    [facility] Safety Plan    Creation Date: 2/12/24 Update Date: 2/11/25   Created By: Olga Godwin       Step 1: Warning Signs:   Warning Signs   heart races   chest pain   overwhelemed with emotions sadness/anger   feel the need to throw something to get out emotions   feeling awful            Step 2: Internal Coping Strategies:   Internal Coping Strategies   positve self talk/ reassurance   going for a walk   listen to music   watch a show            Step 3: People and social settings that provide distraction:   Name Contact Information   Anne- friend in cell phone   Sayde- friend in cell phone    Places   going to a park         Step 4: People whom I can ask for help during a crisis:    Name Contact Information    Jeferson-  in cell phone    Anne- friend in cell phone    Sayde friend in bin phone      Step 5: Professionals or agencies I can contact during a crisis:    Clinican/Agency Name Phone Emergency Contact    [facility] Olga Godwin [phone]     [facility]        Crisis Phone Numbers:   Suicide Prevention Lifeline: Call or Text  688 Crisis Text Line: Text HOME to 851-337   Please note: Some [facility] do not have a separate number for Child/Adolescent specific crisis. If your county is not listed under Child/Adolescent, please call the adult number for your county      Adult Crisis Numbers: Child/Adolescent Crisis Numbers   [facility]: [phone] [facility]: [phone]   [facility]: [phone] [facility]: [phone]   [facility]: [phone] [place]: [phone]   [facility]: [phone] [place]: [phone]   [place]: [phone]   [facility]: [phone]   [facility]: [phone]   [facility]: [phone] (daytime) [phone] (after hours, weekends, holidays)      Step 6: Making the  environment safer (plan for lethal means safety):   Patient did not identify any lethal methods: Yes     Optional: What is most important to me and worth living for?   Being a mother and modeling for my daughter     Ayesha Safety Plan. Theresa Bettencourt and Navi Ortiz. Used with permission of the authors.

## 2024-02-12 NOTE — PSYCH
Virtual Regular Visit    Verification of patient location:    Patient is located at Home in the following state in which I hold an active license [place]      Assessment/Plan:    Problem List Items Addressed This Visit     Anxiety disorder, unspecified - Primary       Goals addressed in session: Goal 1 and Goal 2          Reason for visit is No chief complaint on file.       Encounter provider Olga Godwin    Provider located at [facility] [place]  [facility] [place]  [address]  [place] [address] [phone]      Recent Visits  No visits were found meeting these conditions.  Showing recent visits within past 7 days and meeting all other requirements  Today's Visits  Date Type Provider Dept   02/12/24 Telemedicine Olga Godwin [facility] [place]   Showing today's visits and meeting all other requirements  Future Appointments  No visits were found meeting these conditions.  Showing future appointments within next 150 days and meeting all other requirements       The patient was identified by name and date of birth. Janeth Smith was informed that this is a telemedicine visit and that the visit is being conducted throughthe Epic Embedded platform. She agrees to proceed..  My office door was closed. No one else was in the room.  She acknowledged consent and understanding of privacy and security of the video platform. The patient has agreed to participate and understands they can discontinue the visit at any time.    Patient is aware this is a billable service.     Subjective  Janeth Smith is a 53 y.o. female  .      HPI     No past medical history on file.    No past surgical history on file.    Current Outpatient Medications   Medication Sig Dispense Refill   • Ascorbic Acid (Vitamin C) 100 MG CHEW Chew 1 tablet daily     • Cholecalciferol 50 MCG (2000 UT) CAPS Take 1 capsule by mouth daily     • Multiple Vitamin  "(MULTI-VITAMIN DAILY PO) Take 1 capsule by mouth     • Specialty Vitamins Products (Biotin Plus Keratin) 56838-828 MCG-MG TABS Take 1 tablet by mouth daily       No current facility-administered medications for this visit.        No Known Allergies    Review of Systems    Video Exam    There were no vitals filed for this visit.    Physical Exam     Behavioral Health Psychotherapy Progress Note    Psychotherapy Provided: Individual Psychotherapy     1. Anxiety disorder, unspecified type            Goals addressed in session: Goal 1 and Goal 2     DATA: Clinician met with Janeth via telehealth for individual therapy. Janeth discussed areas of need in treatment and overall goals in therapy. Clinician created treatment plan goals and safety plan during the session.   During this session, this clinician used the following therapeutic modalities: Client-centered Therapy and Supportive Psychotherapy    Substance Abuse was not addressed during this session. If the client is diagnosed with a co-occurring substance use disorder, please indicate any changes in the frequency or amount of use: n/a. Stage of change for addressing substance use diagnoses: No substance use/Not applicable    ASSESSMENT:  Janeth Smith presents with a Euthymic/ normal mood.    Janeth actively participated in the creation of the treatment plan goals.  her affect is Normal range and intensity, which is congruent, with her mood and the content of the session. The client has made progress on their goals.    Janeth Smith presents with a none risk of suicide, none risk of self-harm, and none risk of harm to others.    For any risk assessment that surpasses a \"low\" rating, a safety plan must be developed.    A safety plan was indicated: no  If yes, describe in detail n/a    PLAN: Between sessions, Janeth Smith will utilize communication skills to better express her emotions to others. At the next session, the therapist will use Client-centered " Therapy and Supportive Psychotherapy to address anxiety.    Behavioral Health Treatment Plan and Discharge Planning: Janeth Smith is aware of and agrees to continue to work on their treatment plan. They have identified and are working toward their discharge goals. yes    Visit start and stop times:    02/12/24  Start Time: 0900  Stop Time: 0945  Total Visit Time: 45 minutes

## 2024-02-12 NOTE — BH TREATMENT PLAN
Outpatient Behavioral Health Psychotherapy Treatment Plan    Janethshanon Bunchra  1970     Date of Initial Psychotherapy Assessment: 1/29/24   Date of Current Treatment Plan: 02/12/24  Treatment Plan Target Date: 7/10/24  Treatment Plan Expiration Date: 8/10/24    Diagnosis:   1. Anxiety disorder, unspecified type            Area(s) of Need: Coping with stress, boundaries with others (mother), control anger    Long Term Goal 1 (in the client's own words): decrease anger    Stage of Change: Preparation    Target Date for completion: 8/10/24     Anticipated therapeutic modalities: Client centered, CBT, Supportive psychotherapy     People identified to complete this goal: Katelyn      Objective 1: (identify the means of measuring success in meeting the objective): Verbalize awareness of origins to emotional dysregulation       Objective 2: (identify the means of measuring success in meeting the objective): Learn and implement emotional regulation skills into my weekly routine to improve stability in moods.       Long Term Goal 2 (in the client's own words): cope with stress    Stage of Change: Preparation    Target Date for completion: 8/10/24     Anticipated therapeutic modalities: Client Centered, CBT, Supportive psychotherapy     People identified to complete this goal: Katelyn      Objective 1: (identify the means of measuring success in meeting the objective): Describe worry and anxiety and how it impacts daily functioning       Objective 2: (identify the means of measuring success in meeting the objective): Learn and Implement calming skills         I am currently under the care of a [facility] psychiatric provider: no    My [facility] psychiatric provider is: n/a    I am currently taking psychiatric medications: No    I feel that I will be ready for discharge from mental health care when I reach the following (measurable goal/objective): To be able to better cope with emotions and express  both negative and positive emotions in healthy ways.     For children and adults who have a legal guardian:   Has there been any change to custody orders and/or guardianship status? NA. If yes, attach updated documentation.    I have created my Crisis Plan and have been offered a copy of this plan    Behavioral Health Treatment Plan  Luke: Diagnosis and Treatment Plan explained to Janeth Smith acknowledges an understanding of their diagnosis. Janeth Smith agrees to this treatment plan.    I have been offered a copy of this Treatment Plan. yes    Janeth Smith, 1970, actively participated in the creation of this treatment plan during a virtual session, using the Epic Embedded platform.   Janeth Smith  provided verbal consent on 2/12/2024 at 9:35 AM. The treatment plan was transcribed into the Electronic Health Record at a later time.

## 2024-02-26 ENCOUNTER — TELEMEDICINE (OUTPATIENT)
Dept: BEHAVIORAL/MENTAL HEALTH CLINIC | Facility: CLINIC | Age: 54
End: 2024-02-26
Payer: COMMERCIAL

## 2024-02-26 DIAGNOSIS — F41.9 ANXIETY DISORDER, UNSPECIFIED TYPE: Primary | ICD-10-CM

## 2024-02-26 PROCEDURE — 90834 PSYTX W PT 45 MINUTES: CPT | Performed by: COUNSELOR

## 2024-02-26 NOTE — BH CRISIS PLAN
Client Name: Janeth Smith       Client YOB: 1970    [facility] Safety Plan    Creation Date: 2/12/24 Update Date: 2/11/25   Created By: Olga Godwin       Step 1: Warning Signs:   Warning Signs   heart races   chest pain   overwhelemed with emotions sadness/anger   feel the need to throw something to get out emotions   feeling awful            Step 2: Internal Coping Strategies:   Internal Coping Strategies   positve self talk/ reassurance   going for a walk   listen to music   watch a show            Step 3: People and social settings that provide distraction:   Name Contact Information   Anne- friend in cell phone   Sayde- friend in cell phone    Places   going to a park         Step 4: People whom I can ask for help during a crisis:    Name Contact Information    Jeferson-  in cell phone    Anne- friend in cell phone    Sayde friend in bni phone      Step 5: Professionals or agencies I can contact during a crisis:    Clinican/Agency Name Phone Emergency Contact    [facility] Ol[facility] Godwin [phone]     [facility]        Crisis Phone Numbers:   Suicide Prevention Lifeline: Call or Text  668 Crisis Text Line: Text HOME to 190-428   Please note: Some [facility] do not have a separate number for Child/Adolescent specific crisis. If your county is not listed under Child/Adolescent, please call the adult number for your county      Adult Crisis Numbers: Child/Adolescent Crisis Numbers   [facility]: [phone] [facility]: [phone]   [facility]: [phone] [facility]: [phone]   [facility]: [phone] [place]: [phone]   [facility]: [phone] [place]: [phone]   [place]: [phone]   [facility]: [phone]   [facility]: [phone]   [place] [facility]: [phone] (daytime) [phone] (after hours, weekends, holidays)      Step 6: Making the  environment safer (plan for lethal means safety):   Patient did not identify any lethal methods: Yes     Optional: What is most important to me and worth living for?   Being a mother and modeling for my daughter     [facility]yesha Safety Plan. Theresa Bettencourt and Navi Ortiz. Used with permission of the authors.

## 2024-02-27 NOTE — PSYCH
Virtual Regular Visit    Verification of patient location:    Patient is located at Home in the following state in which I hold an active license [place]      Assessment/Plan:    Problem List Items Addressed This Visit     Anxiety disorder, unspecified - Primary       Goals addressed in session: Goal 1 and Goal 2          Reason for visit is   Chief Complaint   Patient presents with   • Virtual Regular Visit          Encounter provider Olga Godwin    Provider located at [facility] [place]  [facility] [place] [address] [phone]      Recent Visits  Date Type Provider Dept   02/26/24 Telemedicine Olga Godwin [facility] [place]   Showing recent visits within past 7 days and meeting all other requirements  Future Appointments  No visits were found meeting these conditions.  Showing future appointments within next 150 days and meeting all other requirements       The patient was identified by name and date of birth. Janeth Smith was informed that this is a telemedicine visit and that the visit is being conducted throughthe Epic Embedded platform. She agrees to proceed..  My office door was closed. No one else was in the room.  She acknowledged consent and understanding of privacy and security of the video platform. The patient has agreed to participate and understands they can discontinue the visit at any time.    Patient is aware this is a billable service.     Subjective  Janeth Smith is a 53 y.o. female  .      HPI     No past medical history on file.    No past surgical history on file.    Current Outpatient Medications   Medication Sig Dispense Refill   • Ascorbic Acid (Vitamin C) 100 MG CHEW Chew 1 tablet daily     • Cholecalciferol 50 MCG (2000 UT) CAPS Take 1 capsule by mouth daily     • Multiple Vitamin (MULTI-VITAMIN DAILY PO) Take 1 capsule by mouth     • Specialty Vitamins Products (Biotin  "Plus Keratin) 02696-033 MCG-MG TABS Take 1 tablet by mouth daily       No current facility-administered medications for this visit.        No Known Allergies    Review of Systems    Video Exam    There were no vitals filed for this visit.    Physical Exam     Behavioral Health Psychotherapy Progress Note    Psychotherapy Provided: Individual Psychotherapy     1. Anxiety disorder, unspecified type            Goals addressed in session: Goal 1 and Goal 2     DATA: Clinician met with Janeth via telehealth for individual therapy. Clinician explored Janeth's experience with anger and triggers. She discussed anger towards her daughter when she is not paying attention or follow directions. Clinician explored situation and possible resources to assist in better ways to communicate with her daughter.  She also discussed miscommunication with  and working to express herself in an assertive way and express how she is feeling.  During this session, this clinician used the following therapeutic modalities: Client-centered Therapy and Supportive Psychotherapy    Substance Abuse was not addressed during this session. If the client is diagnosed with a co-occurring substance use disorder, please indicate any changes in the frequency or amount of use: n/a. Stage of change for addressing substance use diagnoses: No substance use/Not applicable    ASSESSMENT:  Janeth Smith presents with a Euthymic/ normal mood.     her affect is Normal range and intensity, which is congruent, with her mood and the content of the session. The client has made progress on their goals.    Janeth was open and engaged in the session.  Janeth Smith presents with a none risk of suicide, none risk of self-harm, and none risk of harm to others.    For any risk assessment that surpasses a \"low\" rating, a safety plan must be developed.    A safety plan was indicated: no  If yes, describe in detail n/a    PLAN: Between sessions, Janeth Smith will " assertive communication skills. At the next session, the therapist will use Client-centered Therapy and Solution-Focused Therapy to address anxiety.    Behavioral Health Treatment Plan and Discharge Planning: Janeth Luis is aware of and agrees to continue to work on their treatment plan. They have identified and are working toward their discharge goals. yes    Visit start and stop times:    02/26/24  Start Time: 1700  Stop Time: 1752  Total Visit Time: 52 minutes

## 2024-03-13 ENCOUNTER — OFFICE VISIT (OUTPATIENT)
Dept: URGENT CARE | Age: 54
End: 2024-03-13
Payer: COMMERCIAL

## 2024-03-13 VITALS
DIASTOLIC BLOOD PRESSURE: 68 MMHG | WEIGHT: 143 LBS | TEMPERATURE: 97.6 F | SYSTOLIC BLOOD PRESSURE: 112 MMHG | RESPIRATION RATE: 18 BRPM | HEART RATE: 78 BPM | OXYGEN SATURATION: 98 %

## 2024-03-13 DIAGNOSIS — J02.9 SORE THROAT: Primary | ICD-10-CM

## 2024-03-13 DIAGNOSIS — J06.9 ACUTE URI: ICD-10-CM

## 2024-03-13 LAB — S PYO AG THROAT QL: NEGATIVE

## 2024-03-13 PROCEDURE — 87880 STREP A ASSAY W/OPTIC: CPT

## 2024-03-13 PROCEDURE — 99213 OFFICE O/P EST LOW 20 MIN: CPT

## 2024-03-13 RX ORDER — VALACYCLOVIR HYDROCHLORIDE 500 MG/1
TABLET, FILM COATED ORAL
COMMUNITY
Start: 2024-03-11

## 2024-03-13 RX ORDER — ALBUTEROL SULFATE 90 UG/1
2 AEROSOL, METERED RESPIRATORY (INHALATION) EVERY 4 HOURS PRN
COMMUNITY
Start: 2024-02-03

## 2024-03-13 NOTE — PROGRESS NOTES
[facility]        NAME: Janeth Smith is a 54 y.o. female  : 1970    MRN: [id number]  DATE: 2024  TIME: 10:32 AM    Assessment and Plan   Sore throat [J02.9]  1. Sore throat  POCT rapid ANTIGEN strepA      2. Acute URI          URI symptoms- worsening congestion /sore throat today. No fevers. No cough/SOB/wheezing. Rapid strep negative. Discussed symptom management    Patient Instructions       Follow up with PCP as needed    If tests have been performed at [facility], our office will contact you with results if changes need to be made to the care plan discussed with you at the visit.  You can review your full results on [facility].    Chief Complaint     Chief Complaint   Patient presents with    Cold Like Symptoms     Lots of ear pressure starting last PM. Sore throat for past few days, worsened yesterday. Now having congestion         History of Present Illness       URI symptoms- worsening congestion /sore throat today. No fevers. No cough/SOB/wheezing. Rapid strep negative. Discussed symptom management        Review of Systems   Review of Systems   Constitutional:  Negative for activity change and fever.   HENT:  Positive for congestion, postnasal drip and sore throat.    Respiratory:  Negative for cough and shortness of breath.    All other systems reviewed and are negative.        Current Medications       Current Outpatient Medications:     albuterol (PROVENTIL HFA,VENTOLIN HFA) 90 mcg/act inhaler, Inhale 2 puffs every 4 (four) hours as needed, Disp: , Rfl:     Ascorbic Acid (Vitamin C) 100 MG CHEW, Chew 1 tablet daily, Disp: , Rfl:     Cholecalciferol 50 MCG ( UT) CAPS, Take 1 capsule by mouth daily, Disp: , Rfl:     Multiple Vitamin (MULTI-VITAMIN DAILY PO), Take 1 capsule by mouth, Disp: , Rfl:     Specialty Vitamins Products (Biotin Plus Keratin) 06094-267 MCG-MG TABS, Take 1 tablet by mouth daily, Disp: , Rfl:     valACYclovir (VALTREX) 500 mg tablet, , Disp: ,  Rfl:     Current Allergies     Allergies as of 03/13/2024    (No Known Allergies)            The following portions of the patient's history were reviewed and updated as appropriate: allergies, current medications, past family history, past medical history, past social history, past surgical history and problem list.     No past medical history on file.    No past surgical history on file.    No family history on file.      Medications have been verified.        Objective   /68   Pulse 78   Temp 97.6 °F (36.4 °C)   Resp 18   Wt 64.9 kg (143 lb)   SpO2 98%   No LMP recorded.       Physical Exam     Physical Exam  Vitals reviewed.   Constitutional:       Appearance: Normal appearance.   HENT:      Nose: Congestion and rhinorrhea present.      Mouth/Throat:      Pharynx: No posterior oropharyngeal erythema.   Cardiovascular:      Rate and Rhythm: Normal rate and regular rhythm.      Pulses: Normal pulses.      Heart sounds: Normal heart sounds.   Pulmonary:      Effort: Pulmonary effort is normal.      Breath sounds: Normal breath sounds.   Neurological:      Mental Status: She is alert.

## 2024-03-13 NOTE — PATIENT INSTRUCTIONS
Decongestants ( Pseudoephedrine 30 mg tabs)     Claritin/Zyrtec    Flonase or Astelin    Vicks    Honey/warm gargles    Tylenol/Motrin

## 2024-03-25 ENCOUNTER — TELEPHONE (OUTPATIENT)
Dept: PSYCHIATRY | Facility: CLINIC | Age: 54
End: 2024-03-25

## 2024-03-25 NOTE — TELEPHONE ENCOUNTER
Writer rec a call back from pt regarding ret a call from intake from 3/8 for an appt. Message was sent to intake to follow up. No encounter in chart to attach.

## 2024-03-25 NOTE — TELEPHONE ENCOUNTER
IC called pt back about appt. Pt reports [place] office is too far and pt will remain on wait list for [place] office.

## 2024-04-18 ENCOUNTER — TELEMEDICINE (OUTPATIENT)
Dept: BEHAVIORAL/MENTAL HEALTH CLINIC | Facility: CLINIC | Age: 54
End: 2024-04-18

## 2024-04-18 DIAGNOSIS — F41.9 ANXIETY DISORDER, UNSPECIFIED TYPE: Primary | ICD-10-CM

## 2024-04-18 NOTE — PSYCH
Virtual Regular Visit    Verification of patient location:    Patient is located at Home in the following state in which I hold an active license [place]      Assessment/Plan:    Problem List Items Addressed This Visit     Anxiety disorder, unspecified - Primary       Goals addressed in session: Goal 1 and Goal 2          Reason for visit is No chief complaint on file.       Encounter provider Olga Godwin    Provider located at [facility] [place]  [facility] [place]  [address]  [place] [address] [phone]      Recent Visits  Date Type Provider Dept   04/18/24 Telemedicine Olga Godwin [facility] [place]   Showing recent visits within past 7 days and meeting all other requirements  Future Appointments  No visits were found meeting these conditions.  Showing future appointments within next 150 days and meeting all other requirements       The patient was identified by name and date of birth. Janeth Smith was informed that this is a telemedicine visit and that the visit is being conducted throughthe Epic Embedded platform. She agrees to proceed..  My office door was closed. No one else was in the room.  She acknowledged consent and understanding of privacy and security of the video platform. The patient has agreed to participate and understands they can discontinue the visit at any time.    Patient is aware this is a billable service.     Subjective  Janeth Smith is a 54 y.o. female  .      HPI     No past medical history on file.    No past surgical history on file.    Current Outpatient Medications   Medication Sig Dispense Refill   • albuterol (PROVENTIL HFA,VENTOLIN HFA) 90 mcg/act inhaler Inhale 2 puffs every 4 (four) hours as needed     • Ascorbic Acid (Vitamin C) 100 MG CHEW Chew 1 tablet daily     • Cholecalciferol 50 MCG (2000 UT) CAPS Take 1 capsule by mouth daily     • Multiple Vitamin (MULTI-VITAMIN DAILY  PO) Take 1 capsule by mouth     • Specialty Vitamins Products (Biotin Plus Keratin) 53716-113 MCG-MG TABS Take 1 tablet by mouth daily     • valACYclovir (VALTREX) 500 mg tablet        No current facility-administered medications for this visit.        No Known Allergies    Review of Systems    Video Exam    There were no vitals filed for this visit.    Physical Exam     Behavioral Health Psychotherapy Progress Note    Psychotherapy Provided: Individual Psychotherapy     1. Anxiety disorder, unspecified type            Goals addressed in session: Goal 1 and Goal 2     DATA: Clinician met with Janeth via telehealth for individual therapy. Janeth reports that she and her family have been sick over the last month as well as her aunt in [place] who was hospitalized for a short time.  She processed stress this put on herself and her family and being overwhelmed trying to keep up with her own household chores while taking care of her aunt. She reports aunts healthy is declining and her concern is that she may have to go into a nursing home in the future. Clinician explored feelings about this and ways to utilize community supports. Clinician explored Janeth's support network and utilizing them when needed.   During this session, this clinician used the following therapeutic modalities: Client-centered Therapy and Supportive Psychotherapy    Substance Abuse was not addressed during this session. If the client is diagnosed with a co-occurring substance use disorder, please indicate any changes in the frequency or amount of use: n/a. Stage of change for addressing substance use diagnoses: No substance use/Not applicable    ASSESSMENT:  Janeth Smith presents with a Euthymic/ normal and Anxious mood.     her affect is Normal range and intensity, which is congruent, with her mood and the content of the session. The client has made progress on their goals.    Janeth was open and engaged in the session. Janeth Smith presents  "with a none risk of suicide, none risk of self-harm, and none risk of harm to others.    For any risk assessment that surpasses a \"low\" rating, a safety plan must be developed.    A safety plan was indicated: no  If yes, describe in detail n/a    PLAN: Between sessions, Janeth Smith will utilize healthy communication skills. At the next session, the therapist will use Client-centered Therapy and Supportive Psychotherapy to address anger.    Behavioral Health Treatment Plan and Discharge Planning: Janeth Smith is aware of and agrees to continue to work on their treatment plan. They have identified and are working toward their discharge goals. yes    Visit start and stop times:    04/18/24  Start Time: 1610  Stop Time: 1650  Total Visit Time: 40 minutes            "

## 2024-05-13 ENCOUNTER — TELEMEDICINE (OUTPATIENT)
Dept: BEHAVIORAL/MENTAL HEALTH CLINIC | Facility: CLINIC | Age: 54
End: 2024-05-13

## 2024-05-13 DIAGNOSIS — F41.9 ANXIETY DISORDER, UNSPECIFIED TYPE: Primary | ICD-10-CM

## 2024-05-13 NOTE — PSYCH
Virtual Regular Visit    Verification of patient location:    Patient is located at Home in the following state in which I hold an active license [place]      Assessment/Plan:    Problem List Items Addressed This Visit     Anxiety disorder, unspecified - Primary       Goals addressed in session: Goal 1 and Goal 2          Reason for visit is No chief complaint on file.       Encounter provider Olga Godwin      Recent Visits  Date Type Provider Dept   05/13/24 Telemedicine lOga Godwin [facility] Therapist [place]   Showing recent visits within past 7 days and meeting all other requirements  Future Appointments  No visits were found meeting these conditions.  Showing future appointments within next 150 days and meeting all other requirements       The patient was identified by name and date of birth. Janeth Smith was informed that this is a telemedicine visit and that the visit is being conducted throughthe Epic Embedded platform. She agrees to proceed..  My office door was closed. No one else was in the room.  She acknowledged consent and understanding of privacy and security of the video platform. The patient has agreed to participate and understands they can discontinue the visit at any time.    Patient is aware this is a billable service.     Subjective  Janeth Smith is a 54 y.o. female  .      HPI     No past medical history on file.    No past surgical history on file.    Current Outpatient Medications   Medication Sig Dispense Refill   • albuterol (PROVENTIL HFA,VENTOLIN HFA) 90 mcg/act inhaler Inhale 2 puffs every 4 (four) hours as needed     • Ascorbic Acid (Vitamin C) 100 MG CHEW Chew 1 tablet daily     • Cholecalciferol 50 MCG (2000 UT) CAPS Take 1 capsule by mouth daily     • Multiple Vitamin (MULTI-VITAMIN DAILY PO) Take 1 capsule by mouth     • Specialty Vitamins Products (Biotin Plus Keratin) 00198-688 MCG-MG TABS Take 1 tablet by mouth daily     • valACYclovir (VALTREX) 500 mg  "tablet        No current facility-administered medications for this visit.        No Known Allergies    Review of Systems    Video Exam    There were no vitals filed for this visit.    Physical Exam     Behavioral Health Psychotherapy Progress Note    Psychotherapy Provided: Individual Psychotherapy     1. Anxiety disorder, unspecified type            Goals addressed in session: Goal 1 and Goal 2     DATA: Clinician met with Janeth via telehealth for individual therapy. Janeth processed increased stress related to busy schedule with family. She reports that her daughter has been busy in extra curricular activities and running around every evening.  Clinician explored stress management skills that she utilizes in high levels of stress.  She reports having chest pain and palpitation when in high stress. Clinician discussed breathing exercises and calming strategies to use in the moment to help calm symptoms.  During this session, this clinician used the following therapeutic modalities: Client-centered Therapy and Supportive Psychotherapy    Substance Abuse was not addressed during this session. If the client is diagnosed with a co-occurring substance use disorder, please indicate any changes in the frequency or amount of use: n/a. Stage of change for addressing substance use diagnoses: No substance use/Not applicable    ASSESSMENT:  Janeth Smith presents with a Euthymic/ normal and Depressed mood.     her affect is Normal range and intensity, which is congruent, with her mood and the content of the session. The client has made progress on their goals.    Janeth was open and engaged in the session. Janeth Smith presents with a none risk of suicide, none risk of self-harm, and none risk of harm to others.    For any risk assessment that surpasses a \"low\" rating, a safety plan must be developed.    A safety plan was indicated: no  If yes, describe in detail n/a    PLAN: Between sessions, Janeth Smith will " utilize learned coping skills. At the next session, the therapist will use Client-centered Therapy and Supportive Psychotherapy to address anxiety.    Behavioral Health Treatment Plan and Discharge Planning: Janeth Luis is aware of and agrees to continue to work on their treatment plan. They have identified and are working toward their discharge goals. yes    Visit start and stop times:    05/13/24  Start Time: 1702  Stop Time: 1753  Total Visit Time: 51 minutes

## 2024-06-10 ENCOUNTER — TELEMEDICINE (OUTPATIENT)
Dept: BEHAVIORAL/MENTAL HEALTH CLINIC | Facility: CLINIC | Age: 54
End: 2024-06-10
Payer: COMMERCIAL

## 2024-06-10 DIAGNOSIS — F41.9 ANXIETY DISORDER, UNSPECIFIED TYPE: Primary | ICD-10-CM

## 2024-06-10 PROCEDURE — 90834 PSYTX W PT 45 MINUTES: CPT | Performed by: COUNSELOR

## 2024-06-10 NOTE — PSYCH
Virtual Regular Visit    Verification of patient location:    Patient is located at Home in the following state in which I hold an active license [place]      Assessment/Plan:    Problem List Items Addressed This Visit     Anxiety disorder, unspecified - Primary       Goals addressed in session: Goal 1 and Goal 2          Reason for visit is No chief complaint on file.       Encounter provider Olga Godwin      Recent Visits  Date Type Provider Dept   06/10/24 Telemedicine Olga Godwin [facility] Therapist [place]   Showing recent visits within past 7 days and meeting all other requirements  Future Appointments  No visits were found meeting these conditions.  Showing future appointments within next 150 days and meeting all other requirements       The patient was identified by name and date of birth. Janeth Smith was informed that this is a telemedicine visit and that the visit is being conducted throughthe Epic Embedded platform. She agrees to proceed..  My office door was closed. No one else was in the room.  She acknowledged consent and understanding of privacy and security of the video platform. The patient has agreed to participate and understands they can discontinue the visit at any time.    Patient is aware this is a billable service.     Subjective  Janeth Smith is a 54 y.o. female  .      HPI     No past medical history on file.    No past surgical history on file.    Current Outpatient Medications   Medication Sig Dispense Refill   • albuterol (PROVENTIL HFA,VENTOLIN HFA) 90 mcg/act inhaler Inhale 2 puffs every 4 (four) hours as needed     • Ascorbic Acid (Vitamin C) 100 MG CHEW Chew 1 tablet daily     • Cholecalciferol 50 MCG (2000 UT) CAPS Take 1 capsule by mouth daily     • Multiple Vitamin (MULTI-VITAMIN DAILY PO) Take 1 capsule by mouth     • Specialty Vitamins Products (Biotin Plus Keratin) 76847-621 MCG-MG TABS Take 1 tablet by mouth daily     • valACYclovir (VALTREX) 500 mg  "tablet        No current facility-administered medications for this visit.        No Known Allergies    Review of Systems    Video Exam    There were no vitals filed for this visit.    Physical Exam     Behavioral Health Psychotherapy Progress Note    Psychotherapy Provided: Individual Psychotherapy     1. Anxiety disorder, unspecified type            Goals addressed in session: Goal 1 and Goal 2     DATA: Clinician met with Janeth via telehealth for individual therapy. Janeth processed stress related to  surgery next week and helping him through his recovery. She reports that her schedule has been less busy with her daughter due to summer vacation beginning. She dicussed management of stressors but working on taking her time and utilize positive self talk and encouragement.  Janeth discussed desire to work on increase positive relationship and possible resources to do so. Clinician provided possible community resources and engaging in activities that she enjoys with others.   During this session, this clinician used the following therapeutic modalities: Client-centered Therapy and Supportive Psychotherapy    Substance Abuse was not addressed during this session. If the client is diagnosed with a co-occurring substance use disorder, please indicate any changes in the frequency or amount of use: n/a. Stage of change for addressing substance use diagnoses: No substance use/Not applicable    ASSESSMENT:  Janeth Smith presents with a Euthymic/ normal mood.     her affect is Normal range and intensity, which is congruent, with her mood and the content of the session. The client has made progress on their goals.    Janeth was open and engaged in the session. Janeth Smith presents with a none risk of suicide, none risk of self-harm, and none risk of harm to others.    For any risk assessment that surpasses a \"low\" rating, a safety plan must be developed.    A safety plan was indicated: no  If yes, describe " in detail n/a    PLAN: Between sessions, Janeth Smith will utilize healthy communication skills. At the next session, the therapist will use Client-centered Therapy, Solution-Focused Therapy, and Supportive Psychotherapy to address anxiety.    Behavioral Health Treatment Plan and Discharge Planning: Janeth Smith is aware of and agrees to continue to work on their treatment plan. They have identified and are working toward their discharge goals. yes    Visit start and stop times:    06/10/24  Start Time: 1658  Stop Time: 1750  Total Visit Time: 52 minutes

## 2024-07-24 ENCOUNTER — OFFICE VISIT (OUTPATIENT)
Dept: OBGYN CLINIC | Facility: MEDICAL CENTER | Age: 54
End: 2024-07-24
Payer: COMMERCIAL

## 2024-07-24 VITALS
BODY MASS INDEX: 26.31 KG/M2 | HEIGHT: 62 IN | SYSTOLIC BLOOD PRESSURE: 106 MMHG | WEIGHT: 143 LBS | DIASTOLIC BLOOD PRESSURE: 74 MMHG

## 2024-07-24 DIAGNOSIS — Z30.011 ENCOUNTER FOR PRESCRIPTION OF ORAL CONTRACEPTIVES: Primary | ICD-10-CM

## 2024-07-24 DIAGNOSIS — R63.5 WEIGHT GAIN FINDING: ICD-10-CM

## 2024-07-24 DIAGNOSIS — R68.82 LOW LIBIDO: ICD-10-CM

## 2024-07-24 PROCEDURE — 99204 OFFICE O/P NEW MOD 45 MIN: CPT | Performed by: NURSE PRACTITIONER

## 2024-07-24 RX ORDER — ETONOGESTREL AND ETHINYL ESTRADIOL VAGINAL RING .015; .12 MG/D; MG/D
RING VAGINAL
Qty: 1 EACH | Refills: 2 | Status: SHIPPED | OUTPATIENT
Start: 2024-07-24

## 2024-07-24 NOTE — PROGRESS NOTES
Assessment/Plan:  Discussed the natural course of menopause and associated vasomotor symptoms. Average age of menopause in [place] is 52 yo but varies based on genetic factors and tobacco use.   Hot flushes are the most common symptoms experienced by women during the menopausal transition but other symptoms may occur including insomnia, vaginal dryness, and cognitive changes.   There are a wide array of treatment options and with differing efficacies, including lifestyle management strategies (dressing in layers, keeping room temperature low, using a fan, avoiding triggers, weight loss, exercise), CBT, acupuncture, evening primrose oil, hypnosis, vitamin E, OTC Estroven, hormone replacement therapy, and/or SSRI/SNRI treatment.   You are perimenopausal   All questions answered.  Pregnancy prevention is desired.   Discussed birth control options, benefits, risks and alternatives.   Start birth control on day one of next menses and use back up method of contraception x 7 days. Take as prescribed.   Rx sent to pharmacy on file.   She denies cigarette smoking, high blood pressure, a history of DVT, known thrombogenic mutations, migraines with aura, breast cancer, or liver disease.   There are some drugs (such as certain anticonvulsants and antibiotics) that may decrease the contraceptive efficacy of OCPs, and she should call our office to confirm or use a backup method of contraception if taking these drugs.   Expect irregular bleeding for the first 3 months.   ACHES reviewed.     Self care encouraged:  Exercise 150-300  minutes per week including weight bearing exercises for bone health.  This is also a mood elevator and stress reliever. Regular exercise may improve your sleep.   Sleep hygiene-keep your sleep schedule consistent. Use your bed for sleep and sex only. Avoid blue light stimulation 2-3 hours before bed. Try stress relieving activities.  Remain adequately hydrated.Attempt to drink 64 oz of water from waking  until dinner time and drink for mouth comfort after that point.   Recommend not eating 2 hours before bed.   Consider trying meditation.Free apps are available on your phone and will talk you through the process.     Reviewed how libido can be multifactorial. Reviewed possible causes (medical, physical, psychosocial, relational) .   Untreated depression can lead to symptoms of sexual dysfunction.   Anxiety can also compound sexual dysfunction.  Maintain adequate sleep hygiene.   Acknowledge that this is a change in how you have felt in the past regarding sex.   Attempt to limit anxiety related to this change  Continue to have intimate moments with your   Change sexual script. Include use of other material to stimulate sexy thoughts  Consider sexy reading materials or movies 10 minutes per day to increase sexy thoughts  Stimulate 5 senses during sex  Set realistic expectations. You many never crave sex, which is okay. You may choose to have sex to maintain intimacy and connectedness with your .   Data regarding testosterone treatment in premenopausal women are few and inconclusive.   Filbanserin is an FDA approved drug for female sexual dysfunction in premenopausal women.   Side effects include somnolence, dizziness and safety concerns when combined with alcohol and certain medications such as fluconazole and antidepressants. There is always a risk of allergic reaction with any new medication.Take daily 100 mg by mouth at bedtime.  This medication should be delayed by at least 2 hours after alcohol ingestion (1-2 standard drinks) . If 3 or more alcohol drinks are ingested, skip that evenings dose.   If no improvement in 8 weeks, discontinue.   Bupropion (300  mg per day)  is another option and should be dosed in the morning. Side effects including increased anxiety, insomnia and hypertension. This is an off label use of this medication.   Bremelanotide is a subcutaneous injection (1.75 mg)  45 minutes  before an anticipated  sexual activity. Common reactions can include nausea (40% with the first injection, 13% require an antiemetic),vomiting, flushing, headache and hyperpigmentations (1% may be permanent). Not to be used with uncontrolled hypertension of known CV disease.    Return to office in 3 month (before birth control runs out) for birth control follow up and annual exam.     I have spent a total time of 52 minutes in caring for this patient on the day of the visit/encounter including Risks and benefits of tx options, Instructions for management, Patient and family education, Importance of tx compliance, Risk factor reductions, Impressions, Counseling / Coordination of care, Documenting in the medical record, Reviewing / ordering tests, medicine, procedures  , Obtaining or reviewing history  , and discussion topics listed above .           1. Encounter for prescription of oral contraceptives  -     etonogestrel-ethinyl estradiol (NuvaRing) 0.12-0.015 MG/24HR vaginal ring; Insert vaginally and leave in place for 3 consecutive weeks, then remove for 1 week.  2. Weight gain finding  -     Ambulatory Referral to Weight Management; Future  3. BMI 26.0-26.9,adult  -     Ambulatory Referral to Weight Management; Future  4. Low libido             Subjective:      Patient ID: Janeth Smith is a 54 y.o. female.    HPI    Janeth Smith is a 54 y.o.  female who is here today as a new patient  for a problem visit . Arrived 10 minutes after appt start time.   C/o irregular menses, hair loss, weight gain, mood swings/irritability and hot flashes, fatigue and brain fog.    Monthly menses Q 20-25 days x 5 days with heavy flow x 2-3 days then mod to light flow. Menses is acceptable. She preferred when it was every 28 days.     Janeth Smith is sexually active with male partner/  of 13 years. Denies bleeding with some dryness and occ insertional dyspareunia.  She is  monogamous. Does admits to decreased  "libido.   She uses nothing  for contraception.  Pregnancy is not desired. She had a miscarriage in 2019.   She is not interested in STD screening today.   She denies vaginal discharge, itching, pelvic pain.   She has no urinary concerns, does not have incontinence.  No bowel concerns.  No breast concerns.   Non smoker.     BMI 26. Runs 3 times per week. No dietary changes. Feels she eats really healthy. She feels her mid section is thicker and her cellulite is more prominent. Gained approx 11 labs in the last decade. Lost weight previously with use of  and counting calories.     Hot flashes occur throughout the day. She often wears a fan around her neck.   She sleeps well. Hot flashes are not disrupting her sleep.     The following portions of the patient's history were reviewed and updated as appropriate: allergies, current medications, past family history, past medical history, past social history, past surgical history, and problem list.    Review of Systems   Constitutional:  Positive for fatigue. Negative for activity change, appetite change, chills, diaphoresis, fever and unexpected weight change.        Hot flashes   Eyes:  Negative for visual disturbance.   Respiratory:  Negative for chest tightness and shortness of breath.    Cardiovascular:  Negative for chest pain, palpitations and leg swelling.   Gastrointestinal:  Negative for abdominal pain, constipation, diarrhea, nausea and vomiting.   Genitourinary:  Negative for dysuria, menstrual problem, vaginal bleeding and vaginal discharge.   Skin: Negative.    Neurological:  Negative for weakness, light-headedness and headaches.   Psychiatric/Behavioral: Negative.  Negative for sleep disturbance. The patient is not nervous/anxious.    All other systems reviewed and are negative.        Objective:      /74 (BP Location: Left arm, Patient Position: Sitting, Cuff Size: Standard)   Ht 5' 1.5\" (1.562 m)   Wt 64.9 kg (143 lb)   BMI 26.58 kg/m² "          Physical Exam  Vitals and nursing note reviewed.   Constitutional:       Appearance: Normal appearance. She is well-developed.   Skin:     General: Skin is warm and dry.   Neurological:      Mental Status: She is alert and oriented to person, place, and time.   Psychiatric:         Mood and Affect: Mood normal.         Behavior: Behavior normal.

## 2024-07-24 NOTE — PATIENT INSTRUCTIONS
Discussed the natural course of menopause and associated vasomotor symptoms. Average age of menopause in [place] is 52 yo but varies based on genetic factors and tobacco use.   Hot flushes are the most common symptoms experienced by women during the menopausal transition but other symptoms may occur including insomnia, vaginal dryness, and cognitive changes.   There are a wide array of treatment options and with differing efficacies, including lifestyle management strategies (dressing in layers, keeping room temperature low, using a fan, avoiding triggers, weight loss, exercise), CBT, acupuncture, evening primrose oil, hypnosis, vitamin E, OTC Estroven, hormone replacement therapy, and/or SSRI/SNRI treatment.   You are perimenopausal   All questions answered.  Pregnancy prevention is desired.   Discussed birth control options, benefits, risks and alternatives.   Start birth control on day one of next menses and use back up method of contraception x 7 days. Take as prescribed.   Rx sent to pharmacy on file.   She denies cigarette smoking, high blood pressure, a history of DVT, known thrombogenic mutations, migraines with aura, breast cancer, or liver disease.   There are some drugs (such as certain anticonvulsants and antibiotics) that may decrease the contraceptive efficacy of OCPs, and she should call our office to confirm or use a backup method of contraception if taking these drugs.   Expect irregular bleeding for the first 3 months.   ACHES reviewed.     Self care encouraged:  Exercise 150-300  minutes per week including weight bearing exercises for bone health.  This is also a mood elevator and stress reliever. Regular exercise may improve your sleep.   Sleep hygiene-keep your sleep schedule consistent. Use your bed for sleep and sex only. Avoid blue light stimulation 2-3 hours before bed. Try stress relieving activities.  Remain adequately hydrated.Attempt to drink 64 oz of water from waking until dinner time and  drink for mouth comfort after that point.   Recommend not eating 2 hours before bed.   Consider trying meditation.Free apps are available on your phone and will talk you through the process.     Reviewed how libido can be multifactorial. Reviewed possible causes (medical, physical, psychosocial, relational) .   Untreated depression can lead to symptoms of sexual dysfunction.   Anxiety can also compound sexual dysfunction.  Maintain adequate sleep hygiene.   Acknowledge that this is a change in how you have felt in the past regarding sex.   Attempt to limit anxiety related to this change  Continue to have intimate moments with your   Change sexual script. Include use of other material to stimulate sexy thoughts  Consider sexy reading materials or movies 10 minutes per day to increase sexy thoughts  Stimulate 5 senses during sex  Set realistic expectations. You many never crave sex, which is okay. You may choose to have sex to maintain intimacy and connectedness with your .   Data regarding testosterone treatment in premenopausal women are few and inconclusive.   Filbanserin is an FDA approved drug for female sexual dysfunction in premenopausal women.   Side effects include somnolence, dizziness and safety concerns when combined with alcohol and certain medications such as fluconazole and antidepressants. There is always a risk of allergic reaction with any new medication.Take daily 100 mg by mouth at bedtime.  This medication should be delayed by at least 2 hours after alcohol ingestion (1-2 standard drinks) . If 3 or more alcohol drinks are ingested, skip that evenings dose.   If no improvement in 8 weeks, discontinue.   Bupropion (300  mg per day)  is another option and should be dosed in the morning. Side effects including increased anxiety, insomnia and hypertension. This is an off label use of this medication.   Bremelanotide is a subcutaneous injection (1.75 mg)  45 minutes before an anticipated   sexual activity. Common reactions can include nausea (40% with the first injection, 13% require an antiemetic),vomiting, flushing, headache and hyperpigmentations (1% may be permanent). Not to be used with uncontrolled hypertension of known CV disease.    Return to office in 3 month (before birth control runs out) for birth control follow up and annual exam.

## 2024-07-29 NOTE — PROGRESS NOTES
Weight Management Medical Nutrition Assessment  Janeth presented today for meal-planning session. She did not see [facility] provider prior to this visit. Today she weighed 140.4#. Minimal documented medical hx consists of anxiety disorder. No medications noted, but supplements include Vit C, Vit D3, MVI, Biotin Plus Keratin. She noted she is using NuvaRing for perimenopausal symptoms. No recent labs values noted per chart review, but lipid profile 4/12/23 WNL. Diet recall reveals good balance, but some non-nutritive snacking in the afternoon when she comes home from work which she classifies as her problem time. Stated she is a stress-eater. Admitted she used to being a big cheese-eater. Getting morning sickness and find self hungrier with NuvaRing. Trying not to overeat. Plans to start weight training.  Provided Janeth with and reviewed meal-planning session materials including calorie-controlled, balanced meal plan. RD follow-up (body composition) scheduled for 8/15/24.         Patient seen by Medical Provider in past 6 months:  yes  Requested to schedule appointment with Medical Provider: No      Anthropometric Measurements  Start Weight (#): 140.4#  Current Weight (#): as above  TBW % Change from start weight:n/a  Ideal Body Weight (#):111.25# (50.6 kg)  Goal Weight (#):130#  Highest: 148-149#  Lowest:125-126#    Weight Loss History  Previous weight loss attempts: Weight loss program with a fitness influencer    Food and Nutrition Related History  Wake up: 5-5:15 AM  Bedtime: 10-10:30 PM; waking in the middle of the night lately d/t recently starting NuvaRing, but usually sleeps well unless has hot flashes; no overnight eating     Food Recall  Breakfast:6:15 AM- on way to work has banana, peach, or apple; 9-10 AM- 8 oz egg whites, 1/2 small [place] avocado, small bowl of oatmeal with almond milk or overnight oats with blueberries or almonds; no coffee as she stated she is sensitive to the caffeine (but can have  "tea)   Snack:none b/c at work  Lunch:12-1:30 PM- chicken or lean ground beef, rice or pasta, vegetables (non-starchy)  Snack:3:30 PM- dark chocolate almonds or sometimes cookies or cheese stick  Dinner: 6-7 PM- lean meat, starch, vegetable; no dessert typically, but sometimes has 100 kcal Greek yogurt  Snack:none, but has water or herbal tea      Beverages: water- 45 oz, but her goal is to drink 64 oz; may have tea at night; ETOH only socially  Volume of beverage intake: >=45 oz    Weekends: at home, a little better  Cravings: pastry  Trouble area of day: after work     Frequency of Eating out: once weekly  Food restrictions:none   Cooking: self and    Food Shopping: self and      Physical Activity Intake  Activity:back to running outside   Frequency: every other day, 2 miles   Physical limitations/barriers to exercise: rehabbing knee with PT    Estimated Needs  Energy  SECA: BMR:n/a      X 1.3 -1000 =  [place] Energy Needs: BMR: 1194   For maintenance (Calorie Calculator did not calculate need for criteria of \"1-2# loss weekly sedentary\"): 1437             1-2# loss weekly lightly active: 642-1142  Maintenance calories for sedentary activity level: n/a  Protein:~60-75 g      (1.2-1.5g/kg IBW)  Fluid: >=64 oz     (35mL/kg IBW)    Nutrition Diagnosis  Yes;    Overweight/obesity  related to Excess energy intake as evidenced by  BMI more than normative standard for age and sex (overweight 25-29.9)       Nutrition Intervention    Nutrition Prescription  Calories:6695-0835, flex up 200 kcal with exercise  Protein:60-75 g   Fluid:>=64 oz    Meal Plan (Mane/Pro/Carb)  Provided Janeth with 7133-8778 kcal sample meal plan.     Nutrition Education:    Calorie controlled menu  Lean protein food choices  Healthy snack options  Food journaling tips      Nutrition Counseling:  Strategies: meal planning, portion sizes, healthy snack choices, hydration, fiber intake, protein intake, exercise, food " journal      Monitoring and Evaluation:  Evaluation criteria:  Energy Intake  Meet protein needs  Maintain adequate hydration  Monitor weekly weight  Meal planning/preparation  Food journal   Decreased portions at mealtimes and snacks  Physical activity     Barriers to learning:none  Readiness to change: Action:  (Changing behavior)  Comprehension: very good  Expected Compliance: very good

## 2024-07-30 ENCOUNTER — CLINICAL SUPPORT (OUTPATIENT)
Dept: BARIATRICS | Facility: CLINIC | Age: 54
End: 2024-07-30

## 2024-07-30 VITALS — HEIGHT: 62 IN | BODY MASS INDEX: 25.83 KG/M2 | WEIGHT: 140.4 LBS

## 2024-07-30 DIAGNOSIS — R63.5 WEIGHT GAIN FINDING: ICD-10-CM

## 2024-07-30 DIAGNOSIS — R63.5 ABNORMAL WEIGHT GAIN: Primary | ICD-10-CM

## 2024-07-30 PROCEDURE — DB6PK

## 2024-07-30 PROCEDURE — RECHECK

## 2024-08-15 ENCOUNTER — LAB REQUISITION (OUTPATIENT)
Dept: LAB | Facility: HOSPITAL | Age: 54
End: 2024-08-15

## 2024-08-15 ENCOUNTER — CLINICAL SUPPORT (OUTPATIENT)
Dept: BARIATRICS | Facility: CLINIC | Age: 54
End: 2024-08-15

## 2024-08-15 DIAGNOSIS — E66.3 OVERWEIGHT: Primary | ICD-10-CM

## 2024-08-15 DIAGNOSIS — Z00.8 ENCOUNTER FOR OTHER GENERAL EXAMINATION: ICD-10-CM

## 2024-08-15 LAB
CHOLEST SERPL-MCNC: 143 MG/DL
EST. AVERAGE GLUCOSE BLD GHB EST-MCNC: 111 MG/DL
HBA1C MFR BLD: 5.5 %
HDLC SERPL-MCNC: 84 MG/DL
LDLC SERPL CALC-MCNC: 51 MG/DL (ref 0–100)
NONHDLC SERPL-MCNC: 59 MG/DL
TRIGL SERPL-MCNC: 42 MG/DL

## 2024-08-15 PROCEDURE — 80061 LIPID PANEL: CPT | Performed by: PREVENTIVE MEDICINE

## 2024-08-15 PROCEDURE — RECHECK

## 2024-08-15 PROCEDURE — 83036 HEMOGLOBIN GLYCOSYLATED A1C: CPT | Performed by: PREVENTIVE MEDICINE

## 2024-08-15 NOTE — PROGRESS NOTES
Weight Management Medical Nutrition Assessment  Janeth presented today for body composition as part of 6 RD visit bundle package. Today on Tanita scale she weighed 144# which reflects an increase of 3.6# since first appointment 7/30/24 (different scales used). Provided Janeth with results and Tanita results explanation sheet, but did not review them with Janeth at time of appointment as she had arrived late not allowing for time to do so. Will, however, review at 2/6 bundle visit scheduled for 9/5/24. Also encouraged Janeth to reach out via email or phone if she has any questions in the interim.       Patient seen by Medical Provider in past 6 months:  yes  Requested to schedule appointment with Medical Provider: No      Anthropometric Measurements  Start Weight (#): 140.4#  Current Weight (#): 144#  TBW % Change from start weight: 2.3%  Ideal Body Weight (#):111.25# (50.6 kg)  Goal Weight (#):130#  Highest: 148-149#  Lowest:125-126#    The rest of this note was not updated this visit (8/15/24  body composition).    Weight Loss History  Previous weight loss attempts: Weight loss program with a fitness influencer    Food and Nutrition Related History  Wake up: 5-5:15 AM  Bedtime: 10-10:30 PM; waking in the middle of the night lately d/t recently starting NuvaRing, but usually sleeps well unless has hot flashes; no overnight eating     Food Recall- not updated this visit  Breakfast:6:15 AM- on way to work has banana, peach, or apple; 9-10 AM- 8 oz egg whites, 1/2 small [place] avocado, small bowl of oatmeal with almond milk or overnight oats with blueberries or almonds; no coffee as she stated she is sensitive to the caffeine (but can have tea)   Snack:none b/c at work  Lunch:12-1:30 PM- chicken or lean ground beef, rice or pasta, vegetables (non-starchy)  Snack:3:30 PM- dark chocolate almonds or sometimes cookies or cheese stick  Dinner: 6-7 PM- lean meat, starch, vegetable; no dessert typically, but sometimes has 100  "kcal Greek yogurt  Snack:none, but has water or herbal tea      Beverages: water- 45 oz, but her goal is to drink 64 oz; may have tea at night; ETOH only socially  Volume of beverage intake: >=45 oz    Weekends: at home, a little better  Cravings: pastry  Trouble area of day: after work     Frequency of Eating out: once weekly  Food restrictions:none   Cooking: self and    Food Shopping: self and      Physical Activity Intake  Activity:back to running outside   Frequency: every other day, 2 miles   Physical limitations/barriers to exercise: rehabbing knee with PT    Estimated Needs  Energy  SECA: BMR:n/a      X 1.3 -1000 =  [place] [employer] Energy Needs: BMR: 1194   For maintenance (Calorie Calculator did not calculate need for criteria of \"1-2# loss weekly sedentary\"): 1437             1-2# loss weekly lightly active: 642-1142  Maintenance calories for sedentary activity level: n/a  Protein:~60-75 g      (1.2-1.5g/kg IBW)  Fluid: >=64 oz     (35mL/kg IBW)    Nutrition Diagnosis  Yes;    Overweight/obesity  related to Excess energy intake as evidenced by  BMI more than normative standard for age and sex (overweight 25-29.9)       Nutrition Intervention    Nutrition Prescription  Calories:8322-1336, flex up 200 kcal with exercise  Protein:60-75 g   Fluid:>=64 oz    Meal Plan (Mane/Pro/Carb)  Provided Janeth with 1089-4491 kcal sample meal plan.     Nutrition Education:    Calorie controlled menu  Lean protein food choices  Healthy snack options  Food journaling tips      Nutrition Counseling:  Strategies: meal planning, portion sizes, healthy snack choices, hydration, fiber intake, protein intake, exercise, food journal      Monitoring and Evaluation:  Evaluation criteria:  Energy Intake  Meet protein needs  Maintain adequate hydration  Monitor weekly weight  Meal planning/preparation  Food journal   Decreased portions at mealtimes and snacks  Physical activity     Barriers to learning:none  Readiness to " change: Action:  (Changing behavior)  Comprehension: very good  Expected Compliance: very good

## 2024-08-16 VITALS — HEIGHT: 62 IN | WEIGHT: 144 LBS | BODY MASS INDEX: 26.5 KG/M2

## 2024-08-22 ENCOUNTER — TELEPHONE (OUTPATIENT)
Age: 54
End: 2024-08-22

## 2024-09-05 ENCOUNTER — CLINICAL SUPPORT (OUTPATIENT)
Dept: BARIATRICS | Facility: CLINIC | Age: 54
End: 2024-09-05

## 2024-09-05 DIAGNOSIS — R63.5 ABNORMAL WEIGHT GAIN: Primary | ICD-10-CM

## 2024-09-05 PROCEDURE — RECHECK

## 2024-09-05 NOTE — PROGRESS NOTES
Weight Management Medical Nutrition Assessment  Janeth presented today for 2/6 RD bundle visit. Today she weighed 143.6# which reflects a 0.4# since body composition appointment 8/15/24 (different scales used). Reviewed body composition done last visit. Janeth with good understanding. She stated she thinks that sometimes after work she feels overwhelmed with staying on track with having to run around with her daughter, but also wanting to work out and eat right, etc. This stress can lend itself to her overeating. Suggested  visit which is scheduled for 9/27/24. Also suggested mini-workouts for when she is short on time rather than not doing anything. Her goal is to begin strength-training 2x/week.        Patient seen by Medical Provider in past 6 months:  yes  Requested to schedule appointment with Medical Provider: No      Anthropometric Measurements  Start Weight (#): 140.4#  Current Weight (#): 143.6  TBW % Change from start weight: gain of 2.3%  Ideal Body Weight (#):111.25# (50.6 kg)  Goal Weight (#):130#  Highest: 148-149#  Lowest:125-126#    Weight Loss History  Previous weight loss attempts: Weight loss program with a fitness influencer    Food and Nutrition Related History  Wake up: 5-5:15 AM  Bedtime: 10-10:30 PM; waking in the middle of the night lately d/t recently starting NuvaRing, but usually sleeps well unless has hot flashes; no overnight eating     Food Recall  Breakfast:6:15 AM- on way to work has banana, peach, or apple; 9-10 AM- 8 oz egg whites, 1/2 small [place] avocado, small bowl of oatmeal with almond milk or overnight oats with blueberries or almonds; no coffee as she stated she is sensitive to the caffeine (but can have tea)   Snack:none b/c at work  Lunch:12-1:30 PM- chicken or lean ground beef, rice or pasta, vegetables (non-starchy) OR may get salad from cafeteria OR turkey sandwich, fruit yogurt  Snack:3:30 PM- rice cake or 2 with hummus; busy so this is helping her stay out of the  "kitchen; may have cheese stick; no longer buying cookies   Dinner: 6-7 PM- lean meat, starch (pasta or rice or potatoes), vegetable; no dessert typically, but sometimes has 100 kcal Greek yogurt (Oikos)  Snack:none, but has water or herbal tea      Beverages: water- 64 oz; may have tea at night; ETOH only socially  Volume of beverage intake: >=64  oz    Weekends: at home, a little better; may forget to drink water  Cravings: pastry  Trouble area of day: after work     Frequency of Eating out: once weekly  Food restrictions:none   Cooking: self and    Food Shopping: self and      Physical Activity Intake  Activity:back to running outside; wants to also focus on strength-training- 2 days per week  Frequency: running- 2-3x/week for 2 miles   Physical limitations/barriers to exercise: rehabbing knee with PT    Estimated Needs  Energy  SECA: BMR:n/a      X 1.3 -1000 =  [place] [employer] Energy Needs: BMR: 1194   For maintenance (Calorie Calculator did not calculate need for criteria of \"1-2# loss weekly sedentary\"): 1437             1-2# loss weekly lightly active: 642-1142  Maintenance calories for sedentary activity level: n/a  Protein:~60-75 g      (1.2-1.5g/kg IBW)  Fluid: >=64 oz     (35mL/kg IBW)    Nutrition Diagnosis  Yes;    Overweight/obesity  related to Excess energy intake as evidenced by  BMI more than normative standard for age and sex (overweight 25-29.9)       Nutrition Intervention    Nutrition Prescription  Calories:9666-0661, flex up 200 kcal with exercise  Protein:60-75 g   Fluid:>=64 oz    Meal Plan (Mane/Pro/Carb)  Provided Janeth with 4736-8240 kcal sample meal plan.     Nutrition Education:    Calorie controlled menu  Lean protein food choices  Healthy snack options  Food journaling tips      Nutrition Counseling:  Strategies: meal planning, portion sizes, healthy snack choices, hydration, fiber intake, protein intake, exercise, food journal      Monitoring and Evaluation:  Evaluation " criteria:  Energy Intake  Meet protein needs  Maintain adequate hydration  Monitor weekly weight  Meal planning/preparation  Food journal   Decreased portions at mealtimes and snacks  Physical activity     Barriers to learning:none  Readiness to change: Action:  (Changing behavior)  Comprehension: very good  Expected Compliance: very good

## 2024-09-06 VITALS — HEIGHT: 62 IN | WEIGHT: 143.6 LBS | BODY MASS INDEX: 26.43 KG/M2

## 2024-09-19 ENCOUNTER — TELEPHONE (OUTPATIENT)
Age: 54
End: 2024-09-19

## 2024-10-04 ENCOUNTER — CLINICAL SUPPORT (OUTPATIENT)
Dept: BARIATRICS | Facility: CLINIC | Age: 54
End: 2024-10-04

## 2024-10-04 DIAGNOSIS — R63.5 ABNORMAL WEIGHT GAIN: Primary | ICD-10-CM

## 2024-10-04 PROCEDURE — RECHECK

## 2024-10-04 NOTE — PROGRESS NOTES
Patient's name and  were verified during visit.  Patient informed and aware that virtual visits are HIPPA compliant and to further protect their confidentiality the provider was alone and the office door was closed.  Patient consented to the virtual video visit. This visit is free.    Patient presents for 30 minute Behavioral Health Evaluation as a part of Medical Weight Management Program.    Eating behaviors/food choices: Reports improved efforts to manage eating later in the day as her schedule gets busy. She was grabbing convenient foods while taking her daughter to activities instead of having full meals. She reports doing better with cooking dinner, reducing snacking. Suggested also packing a small cooler to take with her to daughter's practice or to have with her when she's out to avoid becoming over hungry, continue to do planning and prepping on the weekends, and find quick meals she could have on busier night such as breakfast for dinner, rotisserie chicken or tuna pouches.    Activity/Exercise:  patient wants to increase her activity from the walking she has been doing but struggles to find the time to do so. She wants to clean out her garage to use a [employer] she has stored there but not sure when she can do that. Discussed breaking down a large task like this into smaller steps, dedicating one hour a few nights a week to cleaning out parts instead of planning to have it all done in a one or two days. Suggested going for walks still while her daughter is at practice or fitting it in wherever time allows.    Sleep/Rest:  Patient was struggling with getting adequqate duration of sleep because of staying up late to take care of responsibilities. Reports she was getting about 4-5 hrs of sleep, has gotten better with going to bed earlier and is getting about 7hrs now. Discussed importance of rest for health and weight management, impact it has on appetite and satiation cues, encouraged to continue efforts  to get rest.    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 She cares for her daughter, her  tries to help where he can but he works shifting hours so not always available and she doesn't have family locally. She is also taking care of her aunt who lives in [place], has to make a lot of phone calls and collaborate with her care providers daily. Her family looks to her to take care of a lot of things, her  has spoken with her about setting boundaries, reviewed the importance of doing this for her own health and wellbeing. Discussed benefit of setting boundaries to free time for herself to invest in her own care, to meet goals she has for herself and to improve her mental health. Patient has anxiety that can sometimes lead to panic when feeling overwhelmed. She did have a therapist in the past but discontinued, she is following up again with mental health care. Reviewed the impact of anxiety on food choices and weight management, encouraged to set boundaries, prioritize her needs and make small efforts to attend to those consistently.    Next Appointment:  with DEMARCO on 10/15

## 2024-10-07 ENCOUNTER — TELEPHONE (OUTPATIENT)
Dept: PSYCHIATRY | Facility: CLINIC | Age: 54
End: 2024-10-07

## 2024-10-07 NOTE — TELEPHONE ENCOUNTER
Forms sent via [employer].    LVM requesting that forms be completed via [employer] at least 1 week prior to appt.

## 2024-10-08 DIAGNOSIS — Z00.6 ENCOUNTER FOR EXAMINATION FOR NORMAL COMPARISON OR CONTROL IN CLINICAL RESEARCH PROGRAM: ICD-10-CM

## 2024-10-14 DIAGNOSIS — Z30.011 ENCOUNTER FOR PRESCRIPTION OF ORAL CONTRACEPTIVES: ICD-10-CM

## 2024-10-15 ENCOUNTER — CLINICAL SUPPORT (OUTPATIENT)
Dept: BARIATRICS | Facility: CLINIC | Age: 54
End: 2024-10-15

## 2024-10-15 ENCOUNTER — APPOINTMENT (OUTPATIENT)
Dept: LAB | Age: 54
End: 2024-10-15
Payer: COMMERCIAL

## 2024-10-15 DIAGNOSIS — Z00.6 ENCOUNTER FOR EXAMINATION FOR NORMAL COMPARISON OR CONTROL IN CLINICAL RESEARCH PROGRAM: ICD-10-CM

## 2024-10-15 DIAGNOSIS — R63.5 ABNORMAL WEIGHT GAIN: Primary | ICD-10-CM

## 2024-10-15 PROCEDURE — RECHECK

## 2024-10-15 PROCEDURE — RECHECK: Performed by: DIETITIAN, REGISTERED

## 2024-10-15 PROCEDURE — 36415 COLL VENOUS BLD VENIPUNCTURE: CPT

## 2024-10-15 RX ORDER — ETONOGESTREL AND ETHINYL ESTRADIOL VAGINAL RING .015; .12 MG/D; MG/D
RING VAGINAL
OUTPATIENT
Start: 2024-10-15

## 2024-10-15 NOTE — PROGRESS NOTES
Weight Management Medical Nutrition Assessment  Janeth presented today for 3/6 RD bundle visit (virtual). She stated she did not weigh herself today, but that she weighed 141# 10/1/24 and also that she weighed 140# at one point that same week. Walking more and started jumping rope. Feels good. Unable to start lifting weights right now. Stated she needs to focus on cardio. Since the last time we met, she stated her stomach had been bothering until this week; not sure if it was something she ate. Noted that she has to watch what she eats when she gets home from work so she does not overdo it. Trying to measure her snacks, but not always being mindful. Stated that she has usually been catching herself before overeating. Stated also that the fact that she has been busy has been helping. Trying cut out dairy as she feels that even cheese sticks have been bothering her, although she may continue to have Greek yogurt. Was food-logging, but not consistently. Endorsed that appointment with  appointment went well. Reported that one point of their discussion was sleep, so she has been trying to get more for better self-care. Encouraged food logging and consistency/moderation in intake and exercise. Provided Janeth with updated snack list. She will contact RD or scheduling staff for next appointment.        Patient seen by Medical Provider in past 6 months:  yes  Requested to schedule appointment with Medical Provider: No      Anthropometric Measurements  Start Weight (#): 140.4#  Current Weight (#): n/a (see narrative)  TBW % Change from start weight: n/a  Ideal Body Weight (#):111.25# (50.6 kg)  Goal Weight (#):130#  Highest: 148-149#  Lowest:125-126#    Weight Loss History  Previous weight loss attempts: Weight loss program with a fitness influencer    Food and Nutrition Related History  Wake up: 5-5:15 AM  Bedtime: 10-10:30 PM; waking in the middle of the night lately d/t recently starting NuvaRing, but usually sleeps  "well unless has hot flashes; no overnight eating     Food Recall  Breakfast:6:15 AM- on way to work has banana, peach, pear, jude, or apple; 9-10 AM- 8 oz egg whites, 1/2 small [place] avocado, small bowl of oatmeal with water and almond milk or overnight oats with blueberries or almonds, maybe PB, Greek yogurt; no coffee as she stated she is sensitive to the caffeine (but can have tea)   Snack: none b/c at work  Lunch:12-1:30 PM- chicken or lean ground beef, rice or pasta, vegetables (non-starchy) OR may get salad from cafeteria OR turkey sandwich, fruit yogurt  Snack:3:30 PM-rice cake with hummus; busy so this is helping her stay out of the kitchen; may have cheese stick; no longer buying cookies   Dinner: 6-7 PM- lean meat, starch (pasta or rice or potatoes), vegetable; no dessert typically, but sometimes has 100 kcal Greek yogurt (Oikos)  Snack:none, but has water or herbal tea      Beverages: water- 64 oz; may have tea at night; ETOH only socially  Volume of beverage intake: >=64  oz    Weekends: at home, a little better; may forget to drink water  Cravings: none identified  Trouble area of day: after work     Frequency of Eating out: once weekly  Food restrictions:none   Cooking: self and    Food Shopping: self and      Physical Activity Intake  Activity: walking/running/cardio  Frequency: running- 2-3x/week for 2 miles   Physical limitations/barriers to exercise: rehabbing knee with PT    Estimated Needs  Energy  SECA: BMR:n/a      X 1.3 -1000 =  [place] [employer] Energy Needs: BMR: 1194   For maintenance (Calorie Calculator did not calculate need for criteria of \"1-2# loss weekly sedentary\"): 1437             1-2# loss weekly lightly active: 642-1142  Maintenance calories for sedentary activity level: n/a  Protein:~60-75 g      (1.2-1.5g/kg IBW)  Fluid: >=64 oz     (35mL/kg IBW)    Nutrition Diagnosis  Yes;    Overweight/obesity  related to Excess energy intake as evidenced by  BMI more than " normative standard for age and sex (overweight 25-29.9)       Nutrition Intervention    Nutrition Prescription  Calories:2077-4619, flex up 200 kcal with exercise  Protein:60-75 g   Fluid:>=64 oz    Meal Plan (Mane/Pro/Carb)  Provided Janeth with 2355-9321 kcal sample meal plan.     Nutrition Education:    Calorie controlled menu  Lean protein food choices  Healthy snack options  Food journaling tips      Nutrition Counseling:  Strategies: meal planning, portion sizes, healthy snack choices, hydration, fiber intake, protein intake, exercise, food journal      Monitoring and Evaluation:  Evaluation criteria:  Energy Intake  Meet protein needs  Maintain adequate hydration  Monitor weekly weight  Meal planning/preparation  Food journal   Decreased portions at mealtimes and snacks  Physical activity     Barriers to learning:none  Readiness to change: Action:  (Changing behavior)  Comprehension: very good  Expected Compliance: very good

## 2024-10-28 LAB
APOB+LDLR+PCSK9 GENE MUT ANL BLD/T: NOT DETECTED
BRCA1+BRCA2 DEL+DUP + FULL MUT ANL BLD/T: NOT DETECTED
MLH1+MSH2+MSH6+PMS2 GN DEL+DUP+FUL M: NOT DETECTED

## 2024-10-29 ENCOUNTER — ANNUAL EXAM (OUTPATIENT)
Dept: OBGYN CLINIC | Facility: MEDICAL CENTER | Age: 54
End: 2024-10-29
Payer: COMMERCIAL

## 2024-10-29 VITALS
HEIGHT: 62 IN | SYSTOLIC BLOOD PRESSURE: 120 MMHG | BODY MASS INDEX: 26.68 KG/M2 | WEIGHT: 145 LBS | DIASTOLIC BLOOD PRESSURE: 86 MMHG

## 2024-10-29 DIAGNOSIS — N39.3 STRESS INCONTINENCE OF URINE: ICD-10-CM

## 2024-10-29 DIAGNOSIS — Z01.419 ENCNTR FOR GYN EXAM (GENERAL) (ROUTINE) W/O ABN FINDINGS: Primary | ICD-10-CM

## 2024-10-29 DIAGNOSIS — Z12.31 ENCOUNTER FOR SCREENING MAMMOGRAM FOR MALIGNANT NEOPLASM OF BREAST: ICD-10-CM

## 2024-10-29 DIAGNOSIS — N95.8 GENITOURINARY SYNDROME OF MENOPAUSE: ICD-10-CM

## 2024-10-29 DIAGNOSIS — Z12.11 SCREEN FOR COLON CANCER: ICD-10-CM

## 2024-10-29 DIAGNOSIS — Z30.011 ENCOUNTER FOR PRESCRIPTION OF ORAL CONTRACEPTIVES: ICD-10-CM

## 2024-10-29 PROCEDURE — S0612 ANNUAL GYNECOLOGICAL EXAMINA: HCPCS | Performed by: NURSE PRACTITIONER

## 2024-10-29 RX ORDER — ESTRADIOL 0.1 MG/G
CREAM VAGINAL
Qty: 42.5 G | Refills: 0 | Status: SHIPPED | OUTPATIENT
Start: 2024-10-29

## 2024-10-29 RX ORDER — ETONOGESTREL AND ETHINYL ESTRADIOL VAGINAL RING .015; .12 MG/D; MG/D
RING VAGINAL
Qty: 1 EACH | Refills: 11 | Status: SHIPPED | OUTPATIENT
Start: 2024-10-29

## 2024-10-29 NOTE — PATIENT INSTRUCTIONS
Calcium 1200 mg (in divided doses-max 600 mg at one time) + 600-1000 IU Vit D daily.   Exercise 150-300 minutes per week minimum including weight bearing exercises.   Pap with high risk HPV Q 5 years, if normal.  Due 2025  Call your insurance company to verify coverage prior to completing any ordered tests.   Annual mammogram ordered and monthly breast self exam recommended.    Colonoscopy-  referred to Dr Wallace.        Brandie 20 times twice daily.   Referred to pelvic floor PT.   Silicone based lubricant with sex. (Use water based lubricant with condoms or sexual toys.)    Vaginal moisturizers twice weekly as needed.   Estrace rx sent to pharmacy on file. Use as directed. Aware of the benefits, risks and alteratives.   Take birth control as directed.   Rx sent to pharmacy on file.  ACHES reviewed.   Aware of benefits, risks and alternatives of birth control.   Return to office in one year or sooner, if needed.

## 2024-10-29 NOTE — PROGRESS NOTES
Assessment/Plan:  Calcium 1200 mg (in divided doses-max 600 mg at one time) + 600-1000 IU Vit D daily.   Exercise 150-300 minutes per week minimum including weight bearing exercises.   Pap with high risk HPV Q 5 years, if normal.  Due   Call your insurance company to verify coverage prior to completing any ordered tests.   Annual mammogram ordered and monthly breast self exam recommended.    Colonoscopy-  referred to Dr Wallace.        Brandie 20 times twice daily.   Referred to pelvic floor PT.   Silicone based lubricant with sex. (Use water based lubricant with condoms or sexual toys.)    Vaginal moisturizers twice weekly as needed.   Estrace rx sent to pharmacy on file. Use as directed. Aware of the benefits, risks and alteratives.   Take birth control as directed.   Rx sent to pharmacy on file.  ACHES reviewed.   Aware of benefits, risks and alternatives of birth control.   Return to office in one year or sooner, if needed.        1. Encntr for gyn exam (general) (routine) w/o abn findings  2. Encounter for screening mammogram for malignant neoplasm of breast  -     Mammo screening bilateral w 3d and cad; Future; Expected date: 2024  3. Screen for colon cancer  -     Ambulatory Referral to Gastroenterology; Future  4. Encounter for prescription of oral contraceptives  -     etonogestrel-ethinyl estradiol (NuvaRing) 0.12-0.015 MG/24HR vaginal ring; Insert vaginally and leave in place for 3 consecutive weeks, then remove for 1 week.  5. Genitourinary syndrome of menopause  -     estradiol (ESTRACE) 0.1 mg/g vaginal cream; Use 0.5 gm intravaginally  daily x 2 weeks then 0.5  gm twice weekly  6. Stress incontinence of urine  -     Ambulatory referral to Physical Therapy; Future             Subjective:      Patient ID: Janeth Smith is a 54 y.o. female.    HPI    Janeth Smith is a 54 y.o.  ( 10 yo girl ) female who is here today for her annual visit. No gynecologic health concerns.   Last in  office on 7/24/24  with c/o irregular menses, hair loss, weight gain, mood swings/irritability and hot flashes, fatigue and brain fog. Started on nuva ring and referred to weight management.   Monthly menses  x 4-5 days with heavy flow x 2 days then mod to light flow. Menses is tolerable now on nuva ring.   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      Janeth Smith is sexually active with male partner/  of 13 years. Denies  vaginal pain, bleeding or dryness.  She is  monogamous. Does admits to decreased libido.   She uses nuva ring for contraception.  She is pleased with this method.   She is not interested in STD screening today.   She denies vaginal discharge, itching, pelvic pain.   She has no urinary concerns, does NACHO.   No bowel concerns.  No breast concerns.       Last pap: 10/21/20 normal with negative HR HPV   Mammogram: 11/07/2023 normal with heterogeneously dense breast tissue LTC 13.5%  Colonoscopy: Not on file; due  DEXA scan: Not on file      Family history of cancer:   Cancer-related family history is negative for Breast cancer, Ovarian cancer, and Colon cancer.      The following portions of the patient's history were reviewed and updated as appropriate: allergies, current medications, past family history, past medical history, past social history, past surgical history, and problem list.    Review of Systems   Constitutional: Negative.  Negative for activity change, appetite change, chills, diaphoresis, fatigue, fever and unexpected weight change.   HENT:  Negative for congestion, dental problem, sneezing, sore throat and trouble swallowing.    Eyes:  Negative for visual disturbance.   Respiratory:  Negative for chest tightness and shortness of breath.    Cardiovascular:  Negative for chest pain and leg swelling.   Gastrointestinal:  Negative for abdominal pain, constipation, diarrhea, nausea and vomiting.   Genitourinary:  Negative for difficulty urinating,  "dyspareunia, dysuria, frequency, hematuria, menstrual problem, pelvic pain, urgency, vaginal bleeding, vaginal discharge and vaginal pain.   Musculoskeletal:  Negative for back pain and neck pain.   Skin: Negative.    Allergic/Immunologic: Negative.    Neurological:  Negative for weakness and headaches.   Hematological:  Negative for adenopathy.   Psychiatric/Behavioral: Negative.           Objective:      /86 (BP Location: Left arm, Patient Position: Sitting, Cuff Size: Standard)   Ht 5' 1.5\" (1.562 m)   Wt 65.8 kg (145 lb)   LMP 10/24/2024 (Exact Date)   BMI 26.95 kg/m²          Physical Exam  Vitals and nursing note reviewed.   Constitutional:       Appearance: Normal appearance. She is well-developed.   HENT:      Head: Normocephalic and atraumatic.   Eyes:      General:         Right eye: No discharge.         Left eye: No discharge.   Neck:      Thyroid: No thyromegaly.      Trachea: Trachea normal.   Cardiovascular:      Rate and Rhythm: Normal rate and regular rhythm.      Heart sounds: Normal heart sounds.   Pulmonary:      Effort: Pulmonary effort is normal.      Breath sounds: Normal breath sounds.   Chest:   Breasts:     Breasts are symmetrical.      Right: Normal. No inverted nipple, mass, nipple discharge, skin change or tenderness.      Left: Normal. No inverted nipple, mass, nipple discharge, skin change or tenderness.   Abdominal:      Palpations: Abdomen is soft.   Genitourinary:     Exam position: Lithotomy position.      Labia:         Right: No rash, tenderness, lesion or injury.         Left: No rash, tenderness, lesion or injury.       Urethra: No prolapse, urethral pain, urethral swelling or urethral lesion.      Vagina: Normal. No signs of injury and foreign body. No vaginal discharge, erythema, tenderness or bleeding.      Cervix: No cervical motion tenderness, discharge, friability, lesion, erythema, cervical bleeding or eversion.      Uterus: Normal.       Adnexa:         Right: " No mass, tenderness or fullness.          Left: No mass, tenderness or fullness.        Rectum: No external hemorrhoid.      Comments: Very guarded during her pelvic exam.   Slight vagina atrophy  Musculoskeletal:         General: Normal range of motion.      Cervical back: Normal range of motion and neck supple.   Lymphadenopathy:      Head:      Right side of head: No submental, submandibular or tonsillar adenopathy.      Left side of head: No submental, submandibular or tonsillar adenopathy.      Cervical: Cervical adenopathy present.      Left cervical: Superficial cervical adenopathy (< 1 cm) present.      Upper Body:      Right upper body: No supraclavicular or axillary adenopathy.      Left upper body: No supraclavicular or axillary adenopathy.      Lower Body: No right inguinal adenopathy. No left inguinal adenopathy.   Skin:     General: Skin is warm and dry.   Neurological:      Mental Status: She is alert and oriented to person, place, and time.   Psychiatric:         Mood and Affect: Mood normal.         Behavior: Behavior normal.

## 2024-10-30 ENCOUNTER — OFFICE VISIT (OUTPATIENT)
Dept: PSYCHIATRY | Facility: CLINIC | Age: 54
End: 2024-10-30
Payer: COMMERCIAL

## 2024-10-30 DIAGNOSIS — F41.9 MILD ANXIETY: Primary | ICD-10-CM

## 2024-10-30 PROCEDURE — 90792 PSYCH DIAG EVAL W/MED SRVCS: CPT | Performed by: PHYSICIAN ASSISTANT

## 2024-10-30 NOTE — ASSESSMENT & PLAN NOTE
Medication options discussed and patient decided to continue with psychotherapy.  Patient states will follow with psychiatry as needed.  Patient contracts for safety and denies any SI.  Call with any questions or concerns.  Follow-up as needed.

## 2024-10-30 NOTE — PSYCH
PSYCHIATRIC EVALUATION     [facility]    Visit Time    Visit Start Time: 0900  Visit Stop Time: 0941  Total Visit Duration:  41 minutes  Assessment & Plan  Mild anxiety  Medication options discussed and patient decided to continue with psychotherapy.  Patient states will follow with psychiatry as needed.  Patient contracts for safety and denies any SI.  Call with any questions or concerns.  Follow-up as needed.           Reason for visit:   Chief Complaint   Patient presents with    [facility]    Anxiety       ANNA MARIE     Janeth is a 54 y.o. female with a history of anxiety symptoms she noticed around 2019/2020 timeframe.  Patient reporting on occasion she has had what she describes as panic attacks and at one time was prescribed hydroxyzine.  Patient denies any other history of psychotropic medications.  Reporting last panic attack was several weeks ago when she was at [employer] and she noticed some mild chest tightness however had used her coping skills and breathing methods and symptoms alleviated.  Patient does not describe any significant depression symptoms.  Patient had concerns due to significant family psychiatric history with mother with depression, father diagnosed with schizophrenia in his 20s, sister who is bipolar and aunt with history of anxiety and depression.  Bipolar symptoms discussed with patient and she does not exhibit any of those behaviors.  Denies any history of psychosis delusional thinking.  States has never attempted suicide and has never had suicidal ideation.  WOO-7 Flowsheet Screening      Flowsheet Row Most Recent Value   Over the last two weeks, how often have you been bothered by the following problems?     Feeling nervous, anxious, or on edge 1   Not being able to stop or control worrying 0   Worrying too much about different things 0   Trouble relaxing  1   Being so restless that it's hard to sit still 1   Becoming easily annoyed or  irritable  1   Feeling afraid as if something awful might happen 0   How difficult have these problems made it for you to do your work, take care of things at home, or get along with other people?  Somewhat difficult   WOO Score  4          PHQ-2/9 Depression Screening    Little interest or pleasure in doing things: 0 - not at all  Feeling down, depressed, or hopeless: 1 - several days  Trouble falling or staying asleep, or sleeping too much: 0 - not at all  Feeling tired or having little energy: 0 - not at all  Poor appetite or overeatin - not at all  Feeling bad about yourself - or that you are a failure or have let yourself or your family down: 0 - not at all  Trouble concentrating on things, such as reading the newspaper or watching television: 0 - not at all  Moving or speaking so slowly that other people could have noticed. Or the opposite - being so fidgety or restless that you have been moving around a lot more than usual: 0 - not at all  Thoughts that you would be better off dead, or of hurting yourself in some way: 0 - not at all  PHQ-9 Score: 1  PHQ-9 Interpretation: No or Minimal depression        Patient denies any SI or SA.  Denies any HI AH or VH.  Family psychiatric history as per HPI.  Denies any inpatient psychiatric admissions.  No outpatient psychiatric treatment.  Does follow with [facility] psychotherapy.  Atarax prescribed in the past for panic attacks otherwise no other medications.  Denies any tobacco use.  Rare alcohol use.  Denies any substance abuse or excessive caffeine.  Educational level with bachelor's degree.  Immediate family , daughter.  House with supportive care and good relationship with .  Keep patient medical technician.  Denies any legal troubles.  Spent 25 years in the [employer] and retired.  Denies any physical sexual trauma.  Denies any PTSD symptoms.  Denies any history of head injury seizures or concussions.    Review Of Systems:     Mood Occasional anxiety  symptoms   Behavior Normal    Thought Content Normal   General Normal    Personality Normal   Other Psych Symptoms Normal   Constitutional Negative   ENT Negative   Cardiovascular Negative   Respiratory Negative   Gastrointestinal Negative   Genitourinary Negative   Musculoskeletal Negative   Integumentary Negative   Neurological Negative   Endocrine Normal    Other Symptoms Normal        Past Psychiatric History:      Past Inpatient Psychiatric Treatment:   None   Past Outpatient Psychiatric Treatment:    Follows with [facility] psychotherapy with last visit June 2024.  Patient states will reestablish and make a follow-up appointment.  Past Suicide Attempts:    no  Past Violent Behavior:    no  Past Psychiatric Medication Trials:    Atarax    Family Psychiatric History:   Family History   Problem Relation Age of Onset    Hypertension Mother     No Known Problems Sister     No Known Problems Sister     No Known Problems Brother     Osteoarthritis Maternal Grandmother     Breast cancer Neg Hx     Ovarian cancer Neg Hx     Colon cancer Neg Hx        Social History:    As per HPI  Social History     Substance and Sexual Activity   Drug Use Never       Traumatic History:     Denies  The following portions of the patient's history were reviewed and updated as appropriate: allergies, current medications, past family history, past medical history, past social history, past surgical history, and problem list.     Social History     Socioeconomic History    Marital status: /Civil Union     Spouse name: Not on file    Number of children: Not on file    Years of education: Not on file    Highest education level: Not on file   Occupational History    Not on file   Tobacco Use    Smoking status: Former     Current packs/day: 0.25     Average packs/day: 0.3 packs/day for 5.0 years (1.3 ttl pk-yrs)     Types: Cigarettes    Smokeless tobacco: Never   Vaping Use    Vaping status: Never Used   Substance and Sexual Activity     Alcohol use: Yes     Comment: Socially. Months can go by and I've had none. Mainly wine.    Drug use: Never    Sexual activity: Yes     Partners: Male     Birth control/protection: None     Comment:    Other Topics Concern    Not on file   Social History Narrative    Not on file     Social Determinants of Health     Financial Resource Strain: Not on file   Food Insecurity: Not on file   Transportation Needs: Not on file   Physical Activity: Not on file   Stress: Not on file   Social Connections: Not on file   Intimate Partner Violence: Not on file   Housing Stability: Not on file     Social History     Social History Narrative    Not on file       Mental status:  Appearance calm and cooperative , adequate hygiene and grooming, and good eye contact    Mood euthymic   Affect affect appropriate    Speech Normal rate and volume   Thought Processes coherent/organized and normal thought processes   Hallucinations no hallucinations present    Thought Content no delusions   Abnormal Thoughts no suicidal thoughts  and no homicidal thoughts    Orientation  oriented to person and place and time   Remote Memory short term memory intact and long term memory intact   Attention Span concentration intact   Intellect Appears to be Above Average Intelligence   Insight Insight intact   Judgement judgment was intact   Muscle Strength Muscle strength and tone were normal and Normal gait    Language no difficulty naming common objects, no difficulty repeating a phrase , and no difficulty writing a sentence    Fund of Knowledge displays adequate knowledge of current events, adequate fund of knowledge regarding past history, and adequate fund of knowledge regarding vocabulary    Pain none   Pain Scale 0       Laboratory Results: Pertinent lab work reviewed.  Patient does have pending labs ordered.    Assessment/Plan:  There are no diagnoses linked to this encounter.     Treatment Recommendations- Risks Benefits      Immediate  Medical/Psychiatric/Psychotherapy Treatments and Any Precautions:     Risks, Benefits And Possible Side Effects Of Medications:  Risks, benefits, and possible side effects of medications explained to patient and patient verbalizes understanding    Controlled Medication Discussion: No records found for controlled prescriptions according to [place] Prescription Drug Monitoring Program.       This note was not shared with the patient due to this is a psychotherapy note

## 2024-11-18 ENCOUNTER — EVALUATION (OUTPATIENT)
Dept: PHYSICAL THERAPY | Facility: REHABILITATION | Age: 54
End: 2024-11-18
Payer: COMMERCIAL

## 2024-11-18 DIAGNOSIS — N81.89 PELVIC FLOOR WEAKNESS IN FEMALE: Primary | ICD-10-CM

## 2024-11-18 DIAGNOSIS — N39.41 URGE INCONTINENCE OF URINE: ICD-10-CM

## 2024-11-18 DIAGNOSIS — N39.3 STRESS INCONTINENCE OF URINE: ICD-10-CM

## 2024-11-18 PROCEDURE — 97530 THERAPEUTIC ACTIVITIES: CPT | Performed by: PHYSICAL THERAPIST

## 2024-11-18 PROCEDURE — 97162 PT EVAL MOD COMPLEX 30 MIN: CPT | Performed by: PHYSICAL THERAPIST

## 2024-11-18 NOTE — PROGRESS NOTES
PT Evaluation     Today's date: 2024  Patient name: Janeth Smith  : 1970  MRN: [id number]  Referring provider: Kaylie Connelly CRNP  Dx:   Encounter Diagnosis     ICD-10-CM    1. Pelvic floor weakness in female  N81.89       2. Stress incontinence of urine  N39.3 Ambulatory referral to Physical Therapy      3. Urge incontinence of urine  N39.41                      Assessment  Impairments: abnormal coordination, abnormal muscle firing, abnormal muscle tone, abnormal or restricted ROM, activity intolerance, impaired physical strength, lacks appropriate home exercise program and pain with function  Symptom irritability: moderate    Assessment details: Janeth Smith is a 54 y.o. female who presents with concerns of urinary incontinence and long standing dyspareunia. Examination reveals pelvic floor muscle weakness along the periurethral tissue as well as mild hypertonicity throughout. She is an excellent, motivated candidate for pelvic floor physical therapy.       The plan of care was discussed and included education regarding pelvic floor anatomy, explanation of exam technique, explanation of exam findings and discussion of treatment plan as well as the importance of patient compliance and adherence to physical therapy visits. Patient would benefit from skilled physical therapy services  to address deficits and ultimately meet goal of independent self management of condition.     Patient provided written and verbal consent for pelvic floor muscle exam: yes        Understanding of Dx/Px/POC: good     Prognosis: good    Goals  STGs to be met in 4 weeks:  * Patient will be compliant with introductory HEP as prescribed.  * Patient will report 40% less pain with palpation.     LTGs to be met by discharge:  * Patient will present with a ANDREINA on the PFDI to indicate improved function. (nonsurg ANDREINA > 13.5 pts, surg ANDREINA > 45 pts)   * Implements relaxation strategies on a daily basis.  * Patient will  "report 80% reduction in discomfort with either palpation or intimacy.  * Reports that she/he can cough/laugh/sneeze with at least 80% less bladder leakage.  * Presents with improved nocturia to 0 toiletings/night for more thorough sleep.   * Patient will use pelvic floor muscles correctly during functional ADLs such as coughing, sneezing, lifting and exercise activities to avoid excessive IAP and PFM strain.   * Patient will be compliant with comprehensive home exercise program for self management of condition.       Plan  Patient would benefit from: skilled physical therapy  Referral necessary: No  Planned modality interventions: biofeedback    Planned therapy interventions: manual therapy, activity modification, breathing training, motor coordination training, patient/caregiver education, strengthening, therapeutic activities, therapeutic exercise and home exercise program    Frequency: 1x week  Duration in weeks: 12  Plan of Care beginning date: 2024  Plan of Care expiration date: 2025  Treatment plan discussed with: patient and PTA    PT Pelvic Floor Subjective:   History of Present Illness:   Patient is a  who has noticed NACHO since the birth of her daughter 11 years ago. \"I am very active and when I run I feel that my bladder is descending.\" She also is challenged with jumping rope to control her bladder.     Reports occ symptoms of urinary urgency with leakage at times if she has a full bladder.     Had recent speculum exam which was painful. Kaylie Vasquez prescribed Estridiol cream which she has been compliant using x 3 weeks.         Recurrent probem      Social Support:     Lives with:  Spouse    Work status: employed full time (works in [employer] in [place])  Diet and Exercise:      Daily walking and jumping rope  Would like to return to lifting weights - she was weightlifting regularly in the past   OB/ gyn History    Gestational History:     Prior Pregnancy: Yes      Number of prior " pregnancies: 2    Number of term pregnancies: 1    Delivery Complications:  Breech presentation    Menstrual History:      Menstrual irregularities regular menses    Menopausal: no menopause    Birth control: contraception    Birth control method: a vaginal ring  no hormone replacement therapy (not in addition to the nuvaring)  Nuvaring- changes every 3 weeks     Bladder Function:      Voiding Difficulties comments:     Voiding frequency: every 1-2 hours and every 3-4 hours    Urinary leakage: urine leakage    Urinary leakage aggravated by: coughing, sneezing, exercise, walking to the bathroom and laughing    Nocturia (episodes per night): 0 and 1    Painful urination: No      Intake (ounces): Water intake (oz): 60-90 oz. Tea intake (oz): hot herbal, black or green tea - 3 cups.   Bowel Function:     Bowel frequency: daily    [place] Stool Scale: type 4  Sexual Function:     Pain during intercourse: Yes      Lubrication Use: Yes    Patient wishes to return to having intercourse: currently unable to have intercourse but wants to  Pain:     Current pain ratin    At best pain ratin    At worst pain ratin    Location:  With intercourse  Diagnostic Tests:     None    Treatments:     None    Patient Goals:     Patient goals for therapy:  Improved pain management, improved quality of life, improved comfort, improved bladder or bowel function, fully empty bladder or bowels and decreased pain      Objective       Abdominal Assessment:      Abdominal Assessment: N/A      General Perineum Exam:   perineum intact.     General perineum exam comments: No discharge, irritation, organ prolapse or skin breakdown evident    Prominent urethral meatus    Patient denies TTP. Able to appropriately engage posterior aspect of pelvic floor however she is markedly weak along the origin of pelvic floor in periurethral tissue bilaterally. She also demonstrates hypersensitivity in this region.     Visual Inspection of Perineum:    Involuntary contraction with coughing: yes  Cotton swab test: non-tender  Cough reflex: cough reflex  Sphincter Tone Resting: normal  Sphincter Tone Squeeze: normal  Sensation: intact    Pelvic Organ Prolapse   no pelvic organ prolapse  Perineal body inspection: within normal limits        Pelvic Floor Muscle Exam:      Breathing pattern with contraction: within normal limits   Pelvic floor muscle relaxation is complete.         PERFECT Score   Power right: 2+/5   Power left: 2+/5   Endurance (seconds to max): 5   Repetitions (before fatigue): 5        pelvic floor exam consent given by patient    Pelvic exam completed: vaginally            Precautions:   Patient Active Problem List   Diagnosis   • Mild anxiety       PRO EVAL RE-EVAL DISCHARGE   PFDI      DAVID-18      VPQ      CPSI-NIH      PGQ          POC Expires Auth Status Start Date Exp Date PT Visit Limit DA expires DA provider   2/10               Date of Service 11/18 pfdi          Visits Used            Visits Remaining                        Manuals                                                            Neuro Re-Ed                                                                                                Ther Ex                                                                                    Ther Activity            Education Anatomy and POC                                                                        Modalities

## 2024-11-20 DIAGNOSIS — N95.8 GENITOURINARY SYNDROME OF MENOPAUSE: ICD-10-CM

## 2024-11-20 DIAGNOSIS — B00.9 HSV INFECTION: ICD-10-CM

## 2024-11-20 DIAGNOSIS — B00.9 HSV INFECTION: Primary | ICD-10-CM

## 2024-11-20 RX ORDER — ESTRADIOL 0.1 MG/G
CREAM VAGINAL
Qty: 42.5 G | Refills: 3 | Status: SHIPPED | OUTPATIENT
Start: 2024-11-20

## 2024-11-20 RX ORDER — VALACYCLOVIR HYDROCHLORIDE 500 MG/1
TABLET, FILM COATED ORAL
Qty: 18 TABLET | Refills: 3 | Status: SHIPPED | OUTPATIENT
Start: 2024-11-20 | End: 2024-11-20 | Stop reason: SDUPTHER

## 2024-11-20 RX ORDER — VALACYCLOVIR HYDROCHLORIDE 500 MG/1
TABLET, FILM COATED ORAL
Qty: 18 TABLET | Refills: 3 | Status: SHIPPED | OUTPATIENT
Start: 2024-11-20 | End: 2025-10-23

## 2024-11-27 ENCOUNTER — OFFICE VISIT (OUTPATIENT)
Dept: PHYSICAL THERAPY | Facility: REHABILITATION | Age: 54
End: 2024-11-27
Payer: COMMERCIAL

## 2024-11-27 DIAGNOSIS — N81.89 PELVIC FLOOR WEAKNESS IN FEMALE: ICD-10-CM

## 2024-11-27 DIAGNOSIS — N39.3 STRESS INCONTINENCE OF URINE: Primary | ICD-10-CM

## 2024-11-27 PROCEDURE — 97112 NEUROMUSCULAR REEDUCATION: CPT

## 2024-11-27 NOTE — PROGRESS NOTES
Daily Note     Today's date: 2024  Patient name: Janeth Smith  : 1970  MRN: [id number]  Referring provider: Kaylie Connelly CRNP  Dx:   Encounter Diagnosis     ICD-10-CM    1. Stress incontinence of urine  N39.3       2. Pelvic floor weakness in female  N81.89                      Subjective: Pt denies any changes, was just evaluated.      Objective: See treatment diary below     hooklying  Study 1 of 3  Overall Treatment Values  Protocol: F Single EMG, [id number]  EMG A Avg(?V) Min(?V) Max(?V) W-R Rise(?V)  Work 11.4 0.7 27.4 10.07  Rest 1.4 0.4 8.2  Study 2 of 3  Overall Treatment Values  Protocol: H Single EMG, 2W4R10  EMG A Avg(?V) Min(?V) Max(?V) W-R Rise(?V)  Work 7.2 0.8 26.7 5.55  Rest 1.6 0.6 9.6  sitting  Study 3 of 3  Overall Treatment Values  Protocol: H Single EMG, 2W4R10  EMG A Avg(?V) Min(?V) Max(?V) W-R Rise(?V)  Work 15.2 6.6 33.6 11.35  Rest 3.9 1.2 18.7       Assessment: Tolerated treatment well. Patient would benefit from continued PT. Used SEMG to improve awareness and ability to relax.  Pt more challenged in sitting position to relax post contraction.  Issued a HEP with focus on anterior engagement and also reviewed moiraack strategy.     Access Code: [email or web address]  URL: [email or web address]  Date: 2024  Prepared by: Dominga Kiran Notes  Exhale and engage your pelvic floor muscles and lower abs.    Exercises  - Anterior Pelvic Floor Contraction with Towel in Hooklying  - 1 x daily - 7 x weekly - 2 sets - 10 reps - 5 hold  - Quadruped Exhale with Pelvic Floor Contraction  - 1 x daily - 7 x weekly - 2 sets - 10 reps - 5 hold  - Quadruped Exhale with Pelvic Floor Contraction and Leg Extension  - 1 x daily - 7 x weekly - 2 sets - 10 reps - 5 hold  - Seated Cough with Pelvic Floor Contraction and Hand to Mouth   - 1 x daily - 7 x weekly - 2 sets - 10 reps - 5 hold  - Supine Bridge with Pelvic Floor Contraction  - 1 x daily - 7 x weekly - 2 sets - 10  reps - 5 hold  Plan: Continue per plan of care.      Precautions:   Patient Active Problem List   Diagnosis    Mild anxiety       PRO EVAL RE-EVAL DISCHARGE   PFDI 75     DAVID-18      VPQ      CPSI-NIH      PGQ          POC Expires Auth Status Start Date Exp Date PT Visit Limit DA expires DA provider   2/10               Date of Service 11/18 11/27          Visits Used  2          Visits Remaining                        Manuals                                                              Neuro Re-Ed                        SEMG  Hooklying & sitting          bridge  10x          S/l hip abduction  20x          Knack strategy  practiced          Anterior focus PFM  10x          Q-ped PFM  10x          Ther Ex                                                                                    Ther Activity            Education Anatomy and POC                                                                        Modalities

## 2024-12-02 ENCOUNTER — OFFICE VISIT (OUTPATIENT)
Dept: PSYCHIATRY | Facility: CLINIC | Age: 54
End: 2024-12-02
Payer: COMMERCIAL

## 2024-12-02 DIAGNOSIS — F41.9 MILD ANXIETY: Primary | ICD-10-CM

## 2024-12-02 PROCEDURE — 99213 OFFICE O/P EST LOW 20 MIN: CPT | Performed by: PHYSICIAN ASSISTANT

## 2024-12-03 NOTE — PSYCH
MEDICATION MANAGEMENT NOTE        [facility]  Visit Time    Visit Start Time: 1600  Visit Stop Time: 1635  Total Visit Duration:  35 minutes  Assessment & Plan  Mild anxiety  Patient states will schedule follow-up with her current psychotherapist  Patient does not wish for any psychotropic medications at this time to treat her anxiety symptoms.  Patient contracts for safety and denies SI.    Patient states will follow-up with psychiatry as needed and call if she has any issues since.          Subjective:     Patient ID: Janeth Simth is a 54 y.o. female here today for follow-up psychiatric visit.  Patient last visit was not interested in any medications for her mild anxiety symptoms and wished to continue psychotherapy.  Patient however has not made follow-up psychotherapy appointment but states will do so in the near future.  Patient states rarely having any panic attacks last 1 was approximately 2 weeks ago where she felt slightly overwhelmed and developed some palpitations.  Did discuss possible psychotropic medications to help with her anxiety symptoms but patient declined and states will reach out if she has any further concerns and will follow-up with psychiatry as as needed.  Patient denies any SI or HI.  Does not exhibit any signs of paranoia, guru, psychosis or delusional thinking.    HPI ROS Appetite Changes and Sleep: normal appetite, normal energy level, and normal number of sleep hours    Review Of Systems:     Mood Euthymic today but reports occasional anxiety.   Behavior Normal    Thought Content Normal   General Normal    Personality Normal   Other Psych Symptoms Normal   Constitutional Negative   ENT Negative   Cardiovascular Negative   Respiratory Negative   Gastrointestinal Negative   Genitourinary Negative   Musculoskeletal Negative   Integumentary Negative   Neurological Negative   Endocrine Normal    Other Symptoms Normal        Laboratory  Results: Lab work reviewed.  Patient recently had normal lipid panel and A1c.    Substance Abuse History:  Social History     Substance and Sexual Activity   Drug Use Never       Family Psychiatric History:   Family History   Problem Relation Age of Onset    Hypertension Mother     No Known Problems Sister     No Known Problems Sister     No Known Problems Brother     Osteoarthritis Maternal Grandmother     Breast cancer Neg Hx     Ovarian cancer Neg Hx     Colon cancer Neg Hx        The following portions of the patient's history were reviewed and updated as appropriate: allergies, current medications, past family history, past medical history, past social history, past surgical history, and problem list.    Social History     Socioeconomic History    Marital status: /Civil Union     Spouse name: Not on file    Number of children: Not on file    Years of education: Not on file    Highest education level: Not on file   Occupational History    Not on file   Tobacco Use    Smoking status: Former     Current packs/day: 0.25     Average packs/day: 0.3 packs/day for 5.0 years (1.3 ttl pk-yrs)     Types: Cigarettes    Smokeless tobacco: Never   Vaping Use    Vaping status: Never Used   Substance and Sexual Activity    Alcohol use: Yes     Comment: Socially. Months can go by and I've had none. Mainly wine.    Drug use: Never    Sexual activity: Yes     Partners: Male     Birth control/protection: None     Comment:    Other Topics Concern    Not on file   Social History Narrative    Not on file     Social Drivers of Health     Financial Resource Strain: Not on file   Food Insecurity: Not on file   Transportation Needs: Not on file   Physical Activity: Not on file   Stress: Not on file   Social Connections: Not on file   Intimate Partner Violence: Not on file   Housing Stability: Not on file     Social History     Social History Narrative    Not on file       Objective:       Mental status:  Appearance Age  appropriate, casually dressed and good hygiene.   Mood euthymic   Affect affect appropriate    Speech Normal rate and volume   Thought Processes coherent/organized and normal thought processes   Hallucinations Denies AVH when asked   Thought Content No delusions or paranoia   Abnormal Thoughts no suicidal thoughts  and no homicidal thoughts    Orientation  oriented to person and place and time   Remote Memory short term memory intact and long term memory intact   Attention Span concentration intact   Intellect Appears to be of Average Intelligence   Insight Insight intact   Judgement judgment was intact   Muscle Strength Muscle strength and tone were normal and Normal gait    Language no difficulty naming common objects and no difficulty repeating a phrase    Fund of Knowledge displays adequate knowledge of current events, adequate fund of knowledge regarding past history, and adequate fund of knowledge regarding vocabulary    Pain none   Pain Scale 0       Assessment/Plan:       Diagnoses and all orders for this visit:    Mild anxiety            Treatment Recommendations- Risks Benefits      Immediate Medical/Psychiatric/Psychotherapy Treatments and Any Precautions:     Risks, Benefits And Possible Side Effects Of Medications: Patient currently is not on any psychotropic medications.    Controlled Medication Discussion: No records found for controlled prescriptions according to [place] Prescription Drug Monitoring Program.      This note was not shared with the patient due to this is a psychotherapy note

## 2024-12-03 NOTE — ASSESSMENT & PLAN NOTE
Patient states will schedule follow-up with her current psychotherapist  Patient does not wish for any psychotropic medications at this time to treat her anxiety symptoms.  Patient contracts for safety and denies SI.    Patient states will follow-up with psychiatry as needed and call if she has any issues since.

## 2024-12-06 ENCOUNTER — OFFICE VISIT (OUTPATIENT)
Dept: PHYSICAL THERAPY | Facility: REHABILITATION | Age: 54
End: 2024-12-06
Payer: COMMERCIAL

## 2024-12-06 DIAGNOSIS — N39.3 STRESS INCONTINENCE OF URINE: Primary | ICD-10-CM

## 2024-12-06 PROCEDURE — 97112 NEUROMUSCULAR REEDUCATION: CPT

## 2024-12-06 NOTE — PROGRESS NOTES
Daily Note     Today's date: 2024  Patient name: Janeth Smith  : 1970  MRN: [id number]  Referring provider: Kaylie Connelly CRNP  Dx:   Encounter Diagnosis     ICD-10-CM    1. Stress incontinence of urine  N39.3             Start Time: 1520  Stop Time: 1600  Total time in clinic (min): 40 minutes    Subjective: Pt denies any changes, feels the cream is working well.        Objective: See treatment diary below     hooklying  Study 1 of 3  Overall Treatment Values  Protocol: F Single EMG, [id number]  EMG A Avg(?V) Min(?V) Max(?V) W-R Rise(?V)  Work 11.4 0.7 27.4 10.07  Rest 1.4 0.4 8.2  Study 2 of 3  Overall Treatment Values  Protocol: H Single EMG, 2W4R10  EMG A Avg(?V) Min(?V) Max(?V) W-R Rise(?V)  Work 7.2 0.8 26.7 5.55  Rest 1.6 0.6 9.6  sitting  Study 3 of 3  Overall Treatment Values  Protocol: H Single EMG, 2W4R10  EMG A Avg(?V) Min(?V) Max(?V) W-R Rise(?V)  Work 15.2 6.6 33.6 11.35  Rest 3.9 1.2 18.7       Assessment: Tolerated treatment well. Patient would benefit from continued PT. Focussed today's session on reformer exercises with emphasis on breath and activation of PFM.      Access Code: [email or web address]  URL: [email or web address]  Date: 2024  Prepared by: Doimnga Kiran Notes  Exhale and engage your pelvic floor muscles and lower abs.    Exercises  - Anterior Pelvic Floor Contraction with Towel in Hooklying  - 1 x daily - 7 x weekly - 2 sets - 10 reps - 5 hold  - Quadruped Exhale with Pelvic Floor Contraction  - 1 x daily - 7 x weekly - 2 sets - 10 reps - 5 hold  - Quadruped Exhale with Pelvic Floor Contraction and Leg Extension  - 1 x daily - 7 x weekly - 2 sets - 10 reps - 5 hold  - Seated Cough with Pelvic Floor Contraction and Hand to Mouth   - 1 x daily - 7 x weekly - 2 sets - 10 reps - 5 hold  - Supine Bridge with Pelvic Floor Contraction  - 1 x daily - 7 x weekly - 2 sets - 10 reps - 5 hold  Plan: Continue per plan of care.      Precautions:   Patient  Active Problem List   Diagnosis    Mild anxiety       PRO EVAL RE-EVAL DISCHARGE   PFDI 75     DAVID-18      VPQ      CPSI-NIH      PGQ          POC Expires Auth Status Start Date Exp Date PT Visit Limit DA expires DA provider   2/10               Date of Service 11/18 11/27 12/06         Visits Used  2 3         Visits Remaining                        Manuals                                                              Neuro Re-Ed            reformer   Supine heels together, heels apart, bridges with pilates ring, straight legs, single leg, tall kneel with ball lift, sitting on mary shoulder rows, chest press different resistances t/o         SEMG  Hooklying & sitting          bridge  10x          S/l hip abduction  20x          Knack strategy  practiced          Anterior focus PFM  10x          Q-ped PFM  10x          Ther Ex                                                                                    Ther Activity            Education Anatomy and POC                                                                        Modalities

## 2024-12-10 ENCOUNTER — OFFICE VISIT (OUTPATIENT)
Dept: PHYSICAL THERAPY | Facility: REHABILITATION | Age: 54
End: 2024-12-10
Payer: COMMERCIAL

## 2024-12-10 DIAGNOSIS — N81.89 PELVIC FLOOR WEAKNESS IN FEMALE: ICD-10-CM

## 2024-12-10 DIAGNOSIS — N39.3 STRESS INCONTINENCE OF URINE: Primary | ICD-10-CM

## 2024-12-10 PROCEDURE — 97110 THERAPEUTIC EXERCISES: CPT

## 2024-12-10 PROCEDURE — 97112 NEUROMUSCULAR REEDUCATION: CPT

## 2024-12-10 NOTE — PROGRESS NOTES
Daily Note     Today's date: 12/10/2024  Patient name: Janeth Smith  : 1970  MRN: [id number]  Referring provider: Kaylie Connelly CRNP  Dx:   Encounter Diagnosis     ICD-10-CM    1. Stress incontinence of urine  N39.3       2. Pelvic floor weakness in female  N81.89             Start Time: 1530  Stop Time: 1623  Total time in clinic (min): 53 minutes    Subjective: Feeling good. Pt shares she really loves jumping rope and her usual workout is 3 minutes straight.      Objective: See treatment diary below     hooklying  Study 1 of 3  Overall Treatment Values  Protocol: F Single EMG, [id number]  EMG A Avg(?V) Min(?V) Max(?V) W-R Rise(?V)  Work 11.4 0.7 27.4 10.07  Rest 1.4 0.4 8.2  Study 2 of 3  Overall Treatment Values  Protocol: H Single EMG, 2W4R10  EMG A Avg(?V) Min(?V) Max(?V) W-R Rise(?V)  Work 7.2 0.8 26.7 5.55  Rest 1.6 0.6 9.6  sitting  Study 3 of 3  Overall Treatment Values  Protocol: H Single EMG, 2W4R10  EMG A Avg(?V) Min(?V) Max(?V) W-R Rise(?V)  Work 15.2 6.6 33.6 11.35  Rest 3.9 1.2 18.7       Assessment: Tolerated treatment well. Patient would benefit from continued PT. Fatigued and challenged by the end of the session but no leakage reproduced.  Slight vaginal pressure with trampoline jumping.     Access Code: [email or web address]  URL: [email or web address]  Date: 2024  Prepared by: Dominga Kiran Notes  Exhale and engage your pelvic floor muscles and lower abs.    Exercises  - Anterior Pelvic Floor Contraction with Towel in Hooklying  - 1 x daily - 7 x weekly - 2 sets - 10 reps - 5 hold  - Quadruped Exhale with Pelvic Floor Contraction  - 1 x daily - 7 x weekly - 2 sets - 10 reps - 5 hold  - Quadruped Exhale with Pelvic Floor Contraction and Leg Extension  - 1 x daily - 7 x weekly - 2 sets - 10 reps - 5 hold  - Seated Cough with Pelvic Floor Contraction and Hand to Mouth   - 1 x daily - 7 x weekly - 2 sets - 10 reps - 5 hold  - Supine Bridge with Pelvic Floor  "Contraction  - 1 x daily - 7 x weekly - 2 sets - 10 reps - 5 hold  Plan: Continue per plan of care.      Precautions:   Patient Active Problem List   Diagnosis    Mild anxiety       PRO EVAL RE-EVAL DISCHARGE   PFDI 75     DAVID-18      VPQ      CPSI-NIH      PGQ          POC Expires Auth Status Start Date Exp Date PT Visit Limit DA expires DA provider   2/10               Date of Service 11/18 11/27 12/06 12/10        Visits Used  2 3 4        Visits Remaining                        Manuals                                                              Neuro Re-Ed            reformer   Supine heels together, heels apart, bridges with pilates ring, straight legs, single leg, tall kneel with ball lift, sitting on mary shoulder rows, chest press different resistances t/o Supine straight legs, 1 leg at a time, 90/90 w/ leg extension  Wheelbarrow 1B        SEMG  Hooklying & sitting          bridge  10x          S/l hip abduction  20x          Knack strategy  practiced          Anterior focus PFM  10x          Q-ped PFM  10x          Ther Ex            bike   L5 8'         trampoline   3x20\"         Jumping baldev   2x20\"         Agility ladder   8'         Jumping rope   Staggered 20\"x2  Double feet 2x30\" + 1'         Blaze pods   Single leg on foam 3x30\" each  On 6 inch box quick feet 3x30\"         Boxing    3x30\"                     Ther Activity            Education Anatomy and POC                                                                        Modalities                                           "

## 2024-12-13 ENCOUNTER — APPOINTMENT (OUTPATIENT)
Dept: PHYSICAL THERAPY | Facility: REHABILITATION | Age: 54
End: 2024-12-13
Payer: COMMERCIAL

## 2024-12-16 ENCOUNTER — APPOINTMENT (OUTPATIENT)
Dept: PHYSICAL THERAPY | Facility: REHABILITATION | Age: 54
End: 2024-12-16
Payer: COMMERCIAL

## 2024-12-18 ENCOUNTER — OFFICE VISIT (OUTPATIENT)
Dept: PHYSICAL THERAPY | Facility: REHABILITATION | Age: 54
End: 2024-12-18
Payer: COMMERCIAL

## 2024-12-18 DIAGNOSIS — N39.3 STRESS INCONTINENCE OF URINE: Primary | ICD-10-CM

## 2024-12-18 DIAGNOSIS — N81.89 PELVIC FLOOR WEAKNESS IN FEMALE: ICD-10-CM

## 2024-12-18 PROCEDURE — 97110 THERAPEUTIC EXERCISES: CPT

## 2024-12-18 PROCEDURE — 97112 NEUROMUSCULAR REEDUCATION: CPT

## 2024-12-18 NOTE — PROGRESS NOTES
Daily Note     Today's date: 2024  Patient name: Janeth Smith  : 1970  MRN: [id number]  Referring provider: Kaylie Connelly CRNP  Dx:   Encounter Diagnosis     ICD-10-CM    1. Stress incontinence of urine  N39.3       2. Pelvic floor weakness in female  N81.89             Start Time: 1545  Stop Time: 1645  Total time in clinic (min): 60 minutes    Subjective: Feels she is making good progress. Continues to apply the cream, plans on reintroducing weight lifting in the New Year.     Objective: See treatment diary below     hooklying  Study 1 of 3  Overall Treatment Values  Protocol: F Single EMG, [id number]  EMG A Avg(?V) Min(?V) Max(?V) W-R Rise(?V)  Work 11.4 0.7 27.4 10.07  Rest 1.4 0.4 8.2  Study 2 of 3  Overall Treatment Values  Protocol: H Single EMG, 2W4R10  EMG A Avg(?V) Min(?V) Max(?V) W-R Rise(?V)  Work 7.2 0.8 26.7 5.55  Rest 1.6 0.6 9.6  sitting  Study 3 of 3  Overall Treatment Values  Protocol: H Single EMG, 2W4R10  EMG A Avg(?V) Min(?V) Max(?V) W-R Rise(?V)  Work 15.2 6.6 33.6 11.35  Rest 3.9 1.2 18.7       Assessment: Tolerated treatment well. Patient would benefit from continued PT. Since she plans on re-introducing weight training in the [place] Appropriately challenged t/o session, no reports of leakage. We scheduled a follow up mid January with N[facility].   Goals  STGs to be met in 4 weeks:  * Patient will be compliant with introductory HEP as prescribed.  * Patient will report 40% less pain with palpation.      LTGs to be met by discharge:  * Patient will present with a ANDREINA on the PFDI to indicate improved function. (nonsurg ANDREINA > 13.5 pts, surg ANDREINA > 45 pts)   * Implements relaxation strategies on a daily basis.  * Patient will report 80% reduction in discomfort with either palpation or intimacy.  * Reports that she/he can cough/laugh/sneeze with at least 80% less bladder leakage.  * Presents with improved nocturia to 0 toiletings/night for more thorough sleep.   * Patient  will use pelvic floor muscles correctly during functional ADLs such as coughing, sneezing, lifting and exercise activities to avoid excessive IAP and PFM strain.   * Patient will be compliant with comprehensive home exercise program for self management of condition.        Access Code: [email or web address]  URL: [email or web address]  Date: 11/27/2024  Prepared by: Dominga Kiran Notes  Exhale and engage your pelvic floor muscles and lower abs.    Exercises  - Anterior Pelvic Floor Contraction with Towel in Hooklying  - 1 x daily - 7 x weekly - 2 sets - 10 reps - 5 hold  - Quadruped Exhale with Pelvic Floor Contraction  - 1 x daily - 7 x weekly - 2 sets - 10 reps - 5 hold  - Quadruped Exhale with Pelvic Floor Contraction and Leg Extension  - 1 x daily - 7 x weekly - 2 sets - 10 reps - 5 hold  - Seated Cough with Pelvic Floor Contraction and Hand to Mouth   - 1 x daily - 7 x weekly - 2 sets - 10 reps - 5 hold  - Supine Bridge with Pelvic Floor Contraction  - 1 x daily - 7 x weekly - 2 sets - 10 reps - 5 hold  Plan: Continue per plan of care.      Precautions:   Patient Active Problem List   Diagnosis    Mild anxiety       PRO EVAL RE-EVAL DISCHARGE   PFDI 75     DAVID-18      VPQ      CPSI-NIH      PGQ          POC Expires Auth Status Start Date Exp Date PT Visit Limit DA expires DA provider   2/10               Date of Service 11/18 11/27 12/06 12/10 12/18       Visits Used  2 3 4 5       Visits Remaining                        Manuals                                                              Neuro Re-Ed            reformer   Supine heels together, heels apart, bridges with pilates ring, straight legs, single leg, tall kneel with ball lift, sitting on mary shoulder rows, chest press different resistances t/o Supine straight legs, 1 leg at a time, 90/90 w/ leg extension  Wheelbarrow 1B Standing series 1B  Straight legs 1B  Single adductor 1B       SEMG  Hooklying & sitting          bridge  10x          S/l hip  "abduction  20x          Knack strategy  practiced          Anterior focus PFM  10x          Q-ped PFM  10x          Ther Ex            bike    L5 8 L5 8'       trampoline    3x20\"        Jumping baldev    2x20\"        Agility ladder    8'        Jumping rope   Staggered 20\"x2  Double feet 2x30\" + 1' Staggered 20\"x2  Double feet 2x30\" + 1'        Blaze pods    Single leg on foam 3x30\" each  On 6 inch box quick feet 3x30\" Single leg on foam 3x30\" each  On 6 inch box quick feet 3x30\"       Boxing     3x30\" 4x30\"       UE strength     Bicep curls 3x10  Bent over rows 3x10 10#  Chest press 10# w/ 90/90 3x10  Triceps standing 15# 3x10          Ther Activity            Education Anatomy and POC                                                                        Modalities                                           "

## 2025-01-16 ENCOUNTER — OFFICE VISIT (OUTPATIENT)
Dept: PHYSICAL THERAPY | Facility: REHABILITATION | Age: 55
End: 2025-01-16
Payer: COMMERCIAL

## 2025-01-16 DIAGNOSIS — N39.3 STRESS INCONTINENCE OF URINE: Primary | ICD-10-CM

## 2025-01-16 DIAGNOSIS — N81.89 PELVIC FLOOR WEAKNESS IN FEMALE: ICD-10-CM

## 2025-01-16 PROCEDURE — 97530 THERAPEUTIC ACTIVITIES: CPT | Performed by: PHYSICAL THERAPIST

## 2025-01-16 PROCEDURE — 97140 MANUAL THERAPY 1/> REGIONS: CPT | Performed by: PHYSICAL THERAPIST

## 2025-01-16 NOTE — PROGRESS NOTES
Daily Note / Discharge    Today's date: 2025  Patient name: Janeth Smith  : 1970  MRN: [id number]  Referring provider: Kaylie Connelly CRNP  Dx:   Encounter Diagnosis     ICD-10-CM    1. Stress incontinence of urine  N39.3       2. Pelvic floor weakness in female  N81.89                        Subjective: Returns after 1 month. Continues to be well pleased with no leakage with coughing/sneezing. Mild UI if she has very full bladder but not otherwise. She has been semi-compliant with exercise due to busyness and illness around the holidays but reports that she feels confident to continue independently. She has been compliant with twice weekly topical estrogen and no longer reports dyspareunia.     Objective: See treatment diary below      Assessment: Tolerated treatment well. Patient  has meet all PT goals and is appropriate for discharge at this time .     Goals  STGs to be met in 4 weeks:  * Patient will be compliant with introductory HEP as prescribed. MET  * Patient will report 40% less pain with palpation. MET     LTGs to be met by discharge:  * Patient will present with a ANDREINA on the PFDI to indicate improved function. (nonsurg ANDREINA > 13.5 pts, surg ANDREINA > 45 pts) MET  * Implements relaxation strategies on a daily basis. MET  * Patient will report 80% reduction in discomfort with either palpation or intimacy. MET  * Reports that she/he can cough/laugh/sneeze with at least 80% less bladder leakage. MET  * Presents with improved nocturia to 0 toiletings/night for more thorough sleep. NOT QUERIED  * Patient will use pelvic floor muscles correctly during functional ADLs such as coughing, sneezing, lifting and exercise activities to avoid excessive IAP and PFM strain. MET  * Patient will be compliant with comprehensive home exercise program for self management of condition. MET     Plan:  Discharge PT.      Precautions:   Patient Active Problem List   Diagnosis    Mild anxiety       PRO EVAL RE-EVAL  "DISCHARGE   PFDI 75  20   DAVID-18      VPQ      CPSI-NIH      PGQ          POC Expires Auth Status Start Date Exp Date PT Visit Limit DA expires DA provider   2/10               Date of Service 11/18 11/27 12/06 12/10 12/18 1/16      Visits Used  2 3 4 5 6      Visits Remaining                        Manuals            PFM cuing and assessment      35'                                            Neuro Re-Ed            reformer   Supine heels together, heels apart, bridges with pilates ring, straight legs, single leg, tall kneel with ball lift, sitting on mary shoulder rows, chest press different resistances t/o Supine straight legs, 1 leg at a time, 90/90 w/ leg extension  Wheelbarrow 1B Standing series 1B  Straight legs 1B  Single adductor 1B       SEMG  Hooklying & sitting          bridge  10x          S/l hip abduction  20x          Knack strategy  practiced   R       Anterior focus PFM  10x          Q-ped PFM  10x          Ther Ex            bike    L5 8 L5 8'       trampoline    3x20\"        Jumping baldev    2x20\"        Agility ladder    8'        Jumping rope   Staggered 20\"x2  Double feet 2x30\" + 1' Staggered 20\"x2  Double feet 2x30\" + 1'        Blaze pods    Single leg on foam 3x30\" each  On 6 inch box quick feet 3x30\" Single leg on foam 3x30\" each  On 6 inch box quick feet 3x30\"       Boxing     3x30\" 4x30\"       UE strength     Bicep curls 3x10  Bent over rows 3x10 10#  Chest press 10# w/ 90/90 3x10  Triceps standing 15# 3x10          Ther Activity            Education Anatomy and POC      DC instructions                                                                   Modalities                                           "

## 2025-01-17 ENCOUNTER — TELEPHONE (OUTPATIENT)
Age: 55
End: 2025-01-17

## 2025-01-17 NOTE — TELEPHONE ENCOUNTER
Caller: Rhona Smith    Doctor and/or Office: [place] or [place] locations    #: [phone]    Escalation: Care Patient was seen at [facility] for a stress fracture yesterday. She is a [email or web address] employee who is looking for a close toe boot to wear to work as they did not have close toe to give her. Do we carry this in the office? Please return call to patient and advise. Thank you

## 2025-01-17 NOTE — TELEPHONE ENCOUNTER
Called and spoke with patient. She is going to come to come go to the [place] office to  a open toe cam boot. Please have her sign the adapt form and PLEASE LEAVE IT FOR ME TO COMPLETE MONDAY. She will need a short boot.

## 2025-03-07 ENCOUNTER — TELEPHONE (OUTPATIENT)
Age: 55
End: 2025-03-07

## 2025-03-07 NOTE — TELEPHONE ENCOUNTER
Pt called for help finding her prep in [facility]. I was able to help the pt and she found the prep

## 2025-04-08 ENCOUNTER — ANESTHESIA (OUTPATIENT)
Dept: ANESTHESIOLOGY | Facility: HOSPITAL | Age: 55
End: 2025-04-08

## 2025-04-08 ENCOUNTER — ANESTHESIA EVENT (OUTPATIENT)
Dept: ANESTHESIOLOGY | Facility: HOSPITAL | Age: 55
End: 2025-04-08

## 2025-04-08 NOTE — ANESTHESIA PREPROCEDURE EVALUATION
Procedure:  PRE-OP ONLY    Stress: negative for ischmia     Relevant Problems   NEURO/PSYCH   (+) Mild anxiety             Anesthesia Plan  ASA Score- 1     Anesthesia Type- IV sedation with anesthesia with ASA Monitors.         Additional Monitors:     Airway Plan:            Plan Factors-    Chart reviewed.   Existing labs reviewed. Patient summary reviewed.                  Induction-     Postoperative Plan-         Informed Consent-       NPO Status:  No vitals data found for the desired time range.

## 2025-04-09 ENCOUNTER — HOSPITAL ENCOUNTER (OUTPATIENT)
Dept: GASTROENTEROLOGY | Facility: HOSPITAL | Age: 55
Setting detail: OUTPATIENT SURGERY
Discharge: HOME | End: 2025-04-09
Attending: INTERNAL MEDICINE
Payer: COMMERCIAL

## 2025-04-09 ENCOUNTER — ANESTHESIA (OUTPATIENT)
Dept: GASTROENTEROLOGY | Facility: HOSPITAL | Age: 55
End: 2025-04-09
Payer: COMMERCIAL

## 2025-04-09 ENCOUNTER — ANESTHESIA EVENT (OUTPATIENT)
Dept: GASTROENTEROLOGY | Facility: HOSPITAL | Age: 55
End: 2025-04-09
Payer: COMMERCIAL

## 2025-04-09 VITALS
OXYGEN SATURATION: 99 % | TEMPERATURE: 97.6 F | HEART RATE: 60 BPM | SYSTOLIC BLOOD PRESSURE: 112 MMHG | RESPIRATION RATE: 21 BRPM | DIASTOLIC BLOOD PRESSURE: 64 MMHG

## 2025-04-09 DIAGNOSIS — Z12.11 SCREENING FOR COLON CANCER: ICD-10-CM

## 2025-04-09 LAB
EXT PREGNANCY TEST URINE: NEGATIVE
EXT. CONTROL: NORMAL

## 2025-04-09 PROCEDURE — 45385 COLONOSCOPY W/LESION REMOVAL: CPT | Performed by: INTERNAL MEDICINE

## 2025-04-09 PROCEDURE — 81025 URINE PREGNANCY TEST: CPT | Performed by: ANESTHESIOLOGY

## 2025-04-09 PROCEDURE — 88305 TISSUE EXAM BY PATHOLOGIST: CPT | Performed by: PATHOLOGY

## 2025-04-09 RX ORDER — ONDANSETRON 2 MG/ML
4 INJECTION INTRAMUSCULAR; INTRAVENOUS ONCE AS NEEDED
Status: DISCONTINUED | OUTPATIENT
Start: 2025-04-09 | End: 2025-04-13 | Stop reason: HOSPADM

## 2025-04-09 RX ORDER — LIDOCAINE HYDROCHLORIDE 10 MG/ML
INJECTION, SOLUTION EPIDURAL; INFILTRATION; INTRACAUDAL; PERINEURAL AS NEEDED
Status: DISCONTINUED | OUTPATIENT
Start: 2025-04-09 | End: 2025-04-09

## 2025-04-09 RX ORDER — PROPOFOL 10 MG/ML
INJECTION, EMULSION INTRAVENOUS AS NEEDED
Status: DISCONTINUED | OUTPATIENT
Start: 2025-04-09 | End: 2025-04-09

## 2025-04-09 RX ORDER — SODIUM CHLORIDE, SODIUM LACTATE, POTASSIUM CHLORIDE, CALCIUM CHLORIDE 600; 310; 30; 20 MG/100ML; MG/100ML; MG/100ML; MG/100ML
125 INJECTION, SOLUTION INTRAVENOUS CONTINUOUS
Status: DISCONTINUED | OUTPATIENT
Start: 2025-04-09 | End: 2025-04-13 | Stop reason: HOSPADM

## 2025-04-09 RX ORDER — ALBUTEROL SULFATE 0.83 MG/ML
2.5 SOLUTION RESPIRATORY (INHALATION) ONCE AS NEEDED
Status: DISCONTINUED | OUTPATIENT
Start: 2025-04-09 | End: 2025-04-13 | Stop reason: HOSPADM

## 2025-04-09 RX ADMIN — PROPOFOL 20 MG: 10 INJECTION, EMULSION INTRAVENOUS at 12:19

## 2025-04-09 RX ADMIN — PROPOFOL 30 MG: 10 INJECTION, EMULSION INTRAVENOUS at 12:21

## 2025-04-09 RX ADMIN — LIDOCAINE HYDROCHLORIDE 50 MG: 10 INJECTION, SOLUTION EPIDURAL; INFILTRATION; INTRACAUDAL; PERINEURAL at 12:01

## 2025-04-09 RX ADMIN — PROPOFOL 100 MG: 10 INJECTION, EMULSION INTRAVENOUS at 12:01

## 2025-04-09 RX ADMIN — PROPOFOL 120 MCG/KG/MIN: 10 INJECTION, EMULSION INTRAVENOUS at 12:03

## 2025-04-09 RX ADMIN — SODIUM CHLORIDE, SODIUM LACTATE, POTASSIUM CHLORIDE, AND CALCIUM CHLORIDE 125 ML/HR: .6; .31; .03; .02 INJECTION, SOLUTION INTRAVENOUS at 11:05

## 2025-04-09 RX ADMIN — PROPOFOL 40 MG: 10 INJECTION, EMULSION INTRAVENOUS at 12:02

## 2025-04-09 NOTE — ANESTHESIA PREPROCEDURE EVALUATION
Procedure:  COLONOSCOPY  Stress: negative for ischmia     Denies the following: CP/SOB with exertion, asthma, COPD, PAOLA, stroke/TIA, seizure    Relevant Problems   NEURO/PSYCH   (+) Mild anxiety        Physical Exam    Airway    Mallampati score: I  TM Distance: >3 FB  Neck ROM: full     Dental   No notable dental hx     Cardiovascular      Pulmonary      Other Findings  post-pubertal.      Anesthesia Plan  ASA Score- 1     Anesthesia Type- IV sedation with anesthesia with ASA Monitors.         Additional Monitors:     Airway Plan:            Plan Factors-Exercise tolerance (METS): >4 METS.    Chart reviewed.   Existing labs reviewed. Patient summary reviewed.    Patient is not a current smoker.      Obstructive sleep apnea risk education given perioperatively.        Induction-     Postoperative Plan-         Informed Consent- Anesthetic plan and risks discussed with patient.  I personally reviewed this patient with the CRNA. Discussed and agreed on the Anesthesia Plan with the CRNA..      NPO Status:  No vitals data found for the desired time range.

## 2025-04-09 NOTE — ANESTHESIA POSTPROCEDURE EVALUATION
Post-Op Assessment Note    CV Status:  Stable    Pain management: satisfactory to patient       Mental Status:  Sleepy and arousable   Hydration Status:  Euvolemic and stable   PONV Controlled:  None   Airway Patency:  Patent     Post Op Vitals Reviewed: Yes    No anethesia notable event occurred.    Staff: Anesthesiologist, CRNA           Last Filed PACU Vitals:  Vitals Value Taken Time   Temp 97.2 °F (36.2 °C) 04/09/25 1238   Pulse 64 04/09/25 1238   /76 04/09/25 1238   Resp 16 04/09/25 1238   SpO2 100 % 04/09/25 1238

## 2025-04-09 NOTE — H&P
History and Physical - [facility]  Janeth Smith 55 y.o. female MRN: [id number]                  HPI: Janeth Smith is a 55 y.o. year old female who presents for open access screening colonoscopy.  Patient was seen by her primary care provider back in 2024 for nausea and vomiting.  She had had norovirus exposure at that time.  She presents today for her first colonoscopy.  She denies any change in her bowel habits.  Denies any rectal bleeding or black stools.  Prior from upper GI standpoint she denies any heartburn, indigestion, dysphagia, nausea, vomiting.    There is no family history of colon cancer or colon polyps.  She does not smoke tobacco and does not drink alcohol        She did undergo [facility] screening that was negative.    I could not find any laboratory data since around 2022.          REVIEW OF SYSTEMS: Per the HPI, and otherwise unremarkable.    Historical Information   Past Medical History:   Diagnosis Date    Heart murmur     Miscarriage      Past Surgical History:   Procedure Laterality Date    BREAST BIOPSY       SECTION  May 14, 2013     Social History   Social History     Substance and Sexual Activity   Alcohol Use Yes    Comment: Socially. Months can go by and I've had none. Mainly wine.     Social History     Substance and Sexual Activity   Drug Use Never     Social History     Tobacco Use   Smoking Status Former    Current packs/day: 0.25    Average packs/day: 0.3 packs/day for 5.0 years (1.3 ttl pk-yrs)    Types: Cigarettes   Smokeless Tobacco Never     Family History   Problem Relation Age of Onset    Hypertension Mother     No Known Problems Sister     No Known Problems Sister     No Known Problems Brother     Osteoarthritis Maternal Grandmother     Breast cancer Neg Hx     Ovarian cancer Neg Hx     Colon cancer Neg Hx        Meds/Allergies       Current Outpatient Medications:     valACYclovir (VALTREX) 500 mg tablet    Ascorbic Acid  (Vitamin C) 100 MG CHEW    Cholecalciferol 50 MCG (2000 UT) CAPS    estradiol (ESTRACE) 0.1 mg/g vaginal cream    etonogestrel-ethinyl estradiol (NuvaRing) 0.12-0.015 MG/24HR vaginal ring    GLUCOSAMINE-MSM-HYALURONIC ACD PO    Multiple Vitamin (MULTI-VITAMIN DAILY PO)    Specialty Vitamins Products (Biotin Plus Keratin) 90528-544 MCG-MG TABS    Current Facility-Administered Medications:     albuterol inhalation solution 2.5 mg, 2.5 mg, Nebulization, Once PRN    lactated ringers infusion, 125 mL/hr, Intravenous, Continuous, 125 mL/hr at 04/09/25 1105    ondansetron (ZOFRAN) injection 4 mg, 4 mg, Intravenous, Once PRN    No Known Allergies    Objective     /69   Pulse 64   Temp 98.1 °F (36.7 °C) (Temporal)   Resp 18   SpO2 100%       PHYSICAL EXAM    Gen: NAD  Head: NCAT  CV: RRR  CHEST: Clear  ABD: soft, NT/ND  EXT: no edema      ASSESSMENT/PLAN:  This is a 55 y.o. year old female here for colonoscopy, and she is stable and optimized for her procedure.

## 2025-04-09 NOTE — DISCHARGE INSTR - AVS FIRST PAGE
Repeat colonoscopy in 5 years, due: 4/8/2030  Personal history of colon polyps      And also due to limitations of a tortuous and redundant colon.  -Please call the Gastroenterology office at [phone] for biopsy results if you do  not hear from our office in 14 days.  -Follow-up with primary care doctor as previously scheduled  -Please call gastroenterology physician on call or go to the emergency department if you develop abdominal pain, rectal bleeding greater than 1 tbsp, black stools, fever greater than 100.5, persistent nausea or vomiting.  Gastroenterology office phone number is [phone].  Suggest high-fiber diet, try obtain 25 to 30 g of fiber in diet daily.  Suggest the addition of a fiber supplement such as Benefiber 2 teaspoonfuls or Citrucel 1 heaping tablespoonful mixed in 6 to 8 ounce of non-carbonated liquid daily

## 2025-04-14 ENCOUNTER — RESULTS FOLLOW-UP (OUTPATIENT)
Dept: GASTROENTEROLOGY | Facility: CLINIC | Age: 55
End: 2025-04-14

## 2025-04-14 PROCEDURE — 88305 TISSUE EXAM BY PATHOLOGIST: CPT | Performed by: PATHOLOGY

## 2025-04-14 NOTE — RESULT ENCOUNTER NOTE
Please recall for repeat colonoscopy in 5 years.    I sent patient Astley Clarke message stating that;    Branden Melgoza, I am writing to review biopsy results from your recent colonoscopy.  The polyps in your colon are benign none growing types of polyps.  This is good news.  Based upon these findings I would recommend that you undergo repeat colonoscopy in 5 years.  Our office should reach out to you as that time approaches however please keep a note of this in your records as well.  Please feel free to reach out if you have any questions or concerns.  Best regards, Dr. Reagan

## 2025-04-15 ENCOUNTER — OFFICE VISIT (OUTPATIENT)
Age: 55
End: 2025-04-15
Payer: COMMERCIAL

## 2025-04-15 DIAGNOSIS — M62.84 SARCOPENIA: Primary | ICD-10-CM

## 2025-04-15 DIAGNOSIS — N95.1 PERIMENOPAUSAL SYMPTOMS: ICD-10-CM

## 2025-04-15 PROCEDURE — 99215 OFFICE O/P EST HI 40 MIN: CPT | Performed by: OBSTETRICS & GYNECOLOGY

## 2025-04-15 NOTE — PROGRESS NOTES
":  Assessment & Plan  Sarcopenia    Orders:    Testosterone; Future    DHEA-sulfate; Future    Perimenopausal symptoms         1) Testosterone discussed in great detail, its source of production, what normal levels are, etc. This would be ideal for athletic performance and libido  2) Testosterone supplementation discussed in detail, including lack of FDA approval for women and cost concerns, as insurance will not reimburse. Side effects include: increased libido, increased muscle mass, decreased fat mass, erythrocytosis ( NOT polycythemia rubra), increased energy, acne, increased hair growth or loss. Need baseline levels to determine starting dose  3) Different options of administration discussed, 'microdosing\" is not a thing in the testosterone world. I want \" adequate dosing\",  I reminded her that it often takes a \" pharmacologic level\" which is much higher than a \" physiologic level\" for results, and that libido often takes 2-3 months to improve.   4) She is interested in cream approach to start. Will notify her of results and determine dose from there. Email with progress reports monthly until desired effect achieved, then annual labs and follow ups.   5) This will not interfere with Nuvaring, which she can continue for now. Eventually would like to transition her to conventional HRT dosing in a year or two.  6) Follow up prn or one year.     This was a 50 minute visit with greater than 50% of time spent in face to face counseling and coordination of care      Rhona presents for hormonal consult, her first visit with me; self referred, sees SHAHLA Connelly with [facility]  Rhona is perimenopausal, on Nuva Ring for menstrual control and contraception, working well. Complains of:  1) Before starting the Nuvaring was having hot flashes, this has resolved  2) Working with  who recommends \" microdosing\" testosterone for stamina, energy, muscle mass. Feels her strength hast decreased greatly.   3) Mild " fatigue in mid afternoon  PMXH: as above, anxiety  SHX: denies tobacco, ETOH  FH:  Mom RA; MGM dementia; MGF cirrhosis. Dad schizophrenia. PGP unknown. 3 siblings, autoimmune, bipolar. 1 daughter, 11, healthy    Review of Systems   Constitutional:  Positive for fatigue.   Gastrointestinal: Negative.    Endocrine: Negative.    Genitourinary: Negative.    Musculoskeletal:         Decreased strength and muscle mass   Skin: Negative.    Neurological: Negative.    Psychiatric/Behavioral: Negative.          Physical Exam  Constitutional:       Appearance: Normal appearance. She is normal weight.   Neurological:      Mental Status: She is alert.

## 2025-04-18 ENCOUNTER — APPOINTMENT (OUTPATIENT)
Dept: LAB | Age: 55
End: 2025-04-18
Payer: COMMERCIAL

## 2025-04-18 DIAGNOSIS — M62.84 SARCOPENIA: ICD-10-CM

## 2025-04-18 LAB — TESTOST SERPL-MSCNC: 15 NG/DL (ref 0–75)

## 2025-04-18 PROCEDURE — 84403 ASSAY OF TOTAL TESTOSTERONE: CPT

## 2025-04-18 PROCEDURE — 82627 DEHYDROEPIANDROSTERONE: CPT

## 2025-04-18 PROCEDURE — 36415 COLL VENOUS BLD VENIPUNCTURE: CPT

## 2025-04-19 ENCOUNTER — RESULTS FOLLOW-UP (OUTPATIENT)
Age: 55
End: 2025-04-19

## 2025-04-19 LAB — DHEA-S SERPL-MCNC: 70.5 UG/DL (ref 29.4–220.5)

## 2025-04-21 DIAGNOSIS — R68.82 LOW LIBIDO: Primary | ICD-10-CM

## 2025-04-30 ENCOUNTER — LAB REQUISITION (OUTPATIENT)
Dept: LAB | Facility: HOSPITAL | Age: 55
End: 2025-04-30

## 2025-04-30 DIAGNOSIS — Z00.8 ENCOUNTER FOR OTHER GENERAL EXAMINATION: ICD-10-CM

## 2025-04-30 LAB
CHOLEST SERPL-MCNC: 166 MG/DL (ref ?–200)
EST. AVERAGE GLUCOSE BLD GHB EST-MCNC: 103 MG/DL
HBA1C MFR BLD: 5.2 %
HDLC SERPL-MCNC: 82 MG/DL
LDLC SERPL CALC-MCNC: 73 MG/DL (ref 0–100)
NONHDLC SERPL-MCNC: 84 MG/DL
TRIGL SERPL-MCNC: 57 MG/DL (ref ?–150)

## 2025-04-30 PROCEDURE — 80061 LIPID PANEL: CPT | Performed by: PREVENTIVE MEDICINE

## 2025-04-30 PROCEDURE — 83036 HEMOGLOBIN GLYCOSYLATED A1C: CPT | Performed by: PREVENTIVE MEDICINE

## 2025-05-13 DIAGNOSIS — R68.82 LOW LIBIDO: Primary | ICD-10-CM

## 2025-07-13 ENCOUNTER — OFFICE VISIT (OUTPATIENT)
Dept: URGENT CARE | Age: 55
End: 2025-07-13
Payer: COMMERCIAL

## 2025-07-13 VITALS
TEMPERATURE: 99.5 F | RESPIRATION RATE: 20 BRPM | HEART RATE: 88 BPM | SYSTOLIC BLOOD PRESSURE: 133 MMHG | DIASTOLIC BLOOD PRESSURE: 62 MMHG | OXYGEN SATURATION: 99 %

## 2025-07-13 DIAGNOSIS — U07.1 POSITIVE SELF-ADMINISTERED ANTIGEN TEST FOR COVID-19: ICD-10-CM

## 2025-07-13 DIAGNOSIS — R05.1 ACUTE COUGH: Primary | ICD-10-CM

## 2025-07-13 PROCEDURE — G0382 LEV 3 HOSP TYPE B ED VISIT: HCPCS

## 2025-07-13 PROCEDURE — S9083 URGENT CARE CENTER GLOBAL: HCPCS

## 2025-07-13 RX ORDER — BENZONATATE 200 MG/1
200 CAPSULE ORAL 3 TIMES DAILY PRN
Qty: 20 CAPSULE | Refills: 0 | Status: SHIPPED | OUTPATIENT
Start: 2025-07-13

## 2025-07-13 NOTE — PATIENT INSTRUCTIONS
Use benzonatate as directed as needed for cough.   Hydration and rest.  Recommend throat lozenges and gargling warm salt water for throat irritation.   Acetaminophen and ibuprofen for pain relief and fever reduction.   Recommend throat lozenges and gargling warm salt water for throat irritation.   PCP follow up in 3-5 days.   Go to an emergency department if difficulty breathing occurs or if symptoms worsen.    Recommended supplements for COVID-19 is the following: Vitamin D3 2000 IU  daily ,  Vitamin C 1g  every 12 hours , Multivitamin Daily

## 2025-07-13 NOTE — PROGRESS NOTES
[facility]  Name: Janeth Smith      : 1970      MRN: [id number]  Encounter Provider: LINDA Lowe  Encounter Date: 2025   Encounter department: [facility] [place]  :  Assessment & Plan  Positive self-administered antigen test for COVID-19         Acute cough    Orders:    benzonatate (TESSALON) 200 MG capsule; Take 1 capsule (200 mg total) by mouth 3 (three) times a day as needed for cough    Patient requesting new COVID test as 1 at home was .  Discussed with patient that COVID testing can be completed but he may not be covered by insurance.  Discussed with patient that at home COVID test can be presumed positive.  Discussed with patient personal medical opinion to hold on Paxlovid given patient has low risk factors with minimal symptoms at this time.  Patient agreeable to plan of care.  Patient requesting D-dimer to be ordered for left calf pain that started on vacation-aware that this test is not ordered in the [facility] setting.  aware to follow-up with PCP and proceed to the ER if symptoms worsen where that D-dimer is not    Patient Instructions  Follow up with PCP in 3-5 days.  Proceed to  ER if symptoms worsen.    If tests are performed, our office will contact you with results only if changes need to made to the care plan discussed with you at the visit. You can review your full results on [facility].    Chief Complaint:   Chief Complaint   Patient presents with    Sore Throat    Generalized Body Aches     Started Th night with sore throat and generalized body aches. Was recently on a cruise where there were some sick children. Did an at home covid test and was positive.      History of Present Illness   Patient is a 55-year-old female who presents with sore throat and cough for 3 days.  States she was on a cruise when symptoms worsen.  States she did do an at-home COVID test today that was  but came positive.  Reports body aches  and sore throat.  Reports cough that was initially productive and has been using Mucinex.  Reports she has taken Tessalon in the past with relief of symptoms.  Patient also reports while on vacation she started with left calf pain.  States they did do a D-dimer test that was positive.  States ultrasound was negative for DVT.  Denies chest pain.  Reports shortness of breath with coughing spells.  Denies asthma history          Review of Systems   Constitutional:  Positive for chills and fever.   HENT:  Positive for sore throat.    Respiratory:  Positive for cough and shortness of breath.    Musculoskeletal:  Positive for arthralgias (Calf pain) and myalgias.   All other systems reviewed and are negative.    Past Medical History   Past Medical History[1]  Past Surgical History[2]  Family History[3]  she reports that she has quit smoking. Her smoking use included cigarettes. She has a 1.3 pack-year smoking history. She has never used smokeless tobacco. She reports current alcohol use. She reports that she does not use drugs.  Current Outpatient Medications   Medication Instructions    Ascorbic Acid (Vitamin C) 100 MG CHEW 1 tablet, Daily    benzonatate (TESSALON) 200 mg, Oral, 3 times daily PRN    Cholecalciferol 50 MCG (2000 UT) CAPS 1 capsule, Daily    estradiol (ESTRACE) 0.1 mg/g vaginal cream use 0.5 grams INTRAVAGINALLY daily for 2 weeks then 0.5 grams two times a week    etonogestrel-ethinyl estradiol (NuvaRing) 0.12-0.015 MG/24HR vaginal ring Insert vaginally and leave in place for 3 consecutive weeks, then remove for 1 week.    GLUCOSAMINE-MSM-HYALURONIC ACD PO Daily    Multiple Vitamin (MULTI-VITAMIN DAILY PO) 1 capsule    other medication, see sig, Medication/product name: testosterone cream  Strength: 10 mg/ ml  Sig (include dose, route, frequency): apply 0.5 ml to labia daily    Specialty Vitamins Products (Biotin Plus Keratin) 48412-161 MCG-MG TABS 1 tablet, Daily    valACYclovir (VALTREX) 500 mg tablet  "Take one tablet po BID x 3 days with an outbreak.   Allergies[4]     Objective   /62   Pulse 88   Temp 99.5 °F (37.5 °C) (Tympanic)   Resp 20   SpO2 99%      Physical Exam  Vitals and nursing note reviewed.   Constitutional:       General: She is not in acute distress.     Appearance: Normal appearance. She is well-developed. She is not ill-appearing, toxic-appearing or diaphoretic.   HENT:      Right Ear: Tympanic membrane, ear canal and external ear normal.      Left Ear: Tympanic membrane, ear canal and external ear normal.      Nose: Congestion present.      Mouth/Throat:      Lips: Pink.      Mouth: Mucous membranes are moist.      Pharynx: Oropharynx is clear. Uvula midline. No pharyngeal swelling, oropharyngeal exudate, posterior oropharyngeal erythema or uvula swelling.     Cardiovascular:      Rate and Rhythm: Normal rate.      Pulses: Normal pulses.      Heart sounds: Normal heart sounds, S1 normal and S2 normal.   Pulmonary:      Effort: Pulmonary effort is normal.      Breath sounds: Normal breath sounds and air entry.     Musculoskeletal:      Left lower leg: Tenderness (Posterior calf tenderness to palpation.  No ecchymosis or erythema.  No swelling noted.  No tenderness to left posterior knee.) present.     Skin:     General: Skin is warm.      Capillary Refill: Capillary refill takes less than 2 seconds.     Neurological:      Mental Status: She is alert.     Psychiatric:         Mood and Affect: Mood normal.         Behavior: Behavior normal.         Thought Content: Thought content normal.         Judgment: Judgment normal.         Portions of the record may have been created with voice recognition software.  Occasional wrong word or \"sound a like\" substitutions may have occurred due to the inherent limitations of voice recognition software.  Read the chart carefully and recognize, using context, where substitutions have occurred.       [1]   Past Medical History:  Diagnosis Date    Heart " murmur     Miscarriage    [2]   Past Surgical History:  Procedure Laterality Date    BREAST BIOPSY       SECTION  May 14, 2013   [3]   Family History  Problem Relation Name Age of Onset    Hypertension Mother Kaylie     No Known Problems Sister      No Known Problems Sister      No Known Problems Brother      Osteoarthritis Maternal Grandmother Nneka     Breast cancer Neg Hx      Ovarian cancer Neg Hx      Colon cancer Neg Hx     [4] No Known Allergies

## 2025-07-13 NOTE — LETTER
July 13, 2025     Patient: Janeth Smith   YOB: 1970   Date of Visit: 7/13/2025       To Whom It May Concern:    It is my medical opinion that Janeth Smith may return to work on 7/15/25 if fever free for 24 hours and symptoms are improving.          Sincerely,        LINDA Lowe    CC: No Recipients